# Patient Record
Sex: FEMALE | Race: ASIAN | NOT HISPANIC OR LATINO | URBAN - METROPOLITAN AREA
[De-identification: names, ages, dates, MRNs, and addresses within clinical notes are randomized per-mention and may not be internally consistent; named-entity substitution may affect disease eponyms.]

---

## 2019-06-15 ENCOUNTER — INPATIENT (INPATIENT)
Facility: HOSPITAL | Age: 79
LOS: 2 days | Discharge: HOME | End: 2019-06-18
Attending: SURGERY | Admitting: SURGERY
Payer: MEDICARE

## 2019-06-15 VITALS
OXYGEN SATURATION: 95 % | RESPIRATION RATE: 24 BRPM | TEMPERATURE: 102 F | SYSTOLIC BLOOD PRESSURE: 112 MMHG | DIASTOLIC BLOOD PRESSURE: 79 MMHG | HEART RATE: 110 BPM

## 2019-06-15 DIAGNOSIS — K81.9 CHOLECYSTITIS, UNSPECIFIED: ICD-10-CM

## 2019-06-15 LAB
ALBUMIN SERPL ELPH-MCNC: 3.5 G/DL — SIGNIFICANT CHANGE UP (ref 3.3–5)
ALP SERPL-CCNC: 88 U/L — SIGNIFICANT CHANGE UP (ref 40–120)
ALT FLD-CCNC: 15 U/L — SIGNIFICANT CHANGE UP (ref 4–33)
ANION GAP SERPL CALC-SCNC: 12 MMO/L — SIGNIFICANT CHANGE UP (ref 7–14)
APPEARANCE UR: SIGNIFICANT CHANGE UP
APTT BLD: 30.2 SEC — SIGNIFICANT CHANGE UP (ref 27.5–36.3)
AST SERPL-CCNC: 27 U/L — SIGNIFICANT CHANGE UP (ref 4–32)
BACTERIA # UR AUTO: NEGATIVE — SIGNIFICANT CHANGE UP
BASE EXCESS BLDV CALC-SCNC: 4.4 MMOL/L — SIGNIFICANT CHANGE UP
BASOPHILS # BLD AUTO: 0.05 K/UL — SIGNIFICANT CHANGE UP (ref 0–0.2)
BASOPHILS NFR BLD AUTO: 0.3 % — SIGNIFICANT CHANGE UP (ref 0–2)
BILIRUB SERPL-MCNC: 1.1 MG/DL — SIGNIFICANT CHANGE UP (ref 0.2–1.2)
BILIRUB UR-MCNC: SIGNIFICANT CHANGE UP
BLD GP AB SCN SERPL QL: NEGATIVE — SIGNIFICANT CHANGE UP
BLOOD GAS VENOUS - CREATININE: 0.61 MG/DL — SIGNIFICANT CHANGE UP (ref 0.5–1.3)
BLOOD UR QL VISUAL: NEGATIVE — SIGNIFICANT CHANGE UP
BUN SERPL-MCNC: 8 MG/DL — SIGNIFICANT CHANGE UP (ref 7–23)
CALCIUM SERPL-MCNC: 9.6 MG/DL — SIGNIFICANT CHANGE UP (ref 8.4–10.5)
CHLORIDE BLDV-SCNC: 99 MMOL/L — SIGNIFICANT CHANGE UP (ref 96–108)
CHLORIDE SERPL-SCNC: 94 MMOL/L — LOW (ref 98–107)
CO2 SERPL-SCNC: 27 MMOL/L — SIGNIFICANT CHANGE UP (ref 22–31)
COLOR SPEC: SIGNIFICANT CHANGE UP
CREAT SERPL-MCNC: 0.56 MG/DL — SIGNIFICANT CHANGE UP (ref 0.5–1.3)
EOSINOPHIL # BLD AUTO: 0.02 K/UL — SIGNIFICANT CHANGE UP (ref 0–0.5)
EOSINOPHIL NFR BLD AUTO: 0.1 % — SIGNIFICANT CHANGE UP (ref 0–6)
GAS PNL BLDV: 133 MMOL/L — LOW (ref 136–146)
GLUCOSE BLDV-MCNC: 146 MG/DL — HIGH (ref 70–99)
GLUCOSE SERPL-MCNC: 143 MG/DL — HIGH (ref 70–99)
GLUCOSE UR-MCNC: NEGATIVE — SIGNIFICANT CHANGE UP
HCO3 BLDV-SCNC: 28 MMOL/L — HIGH (ref 20–27)
HCT VFR BLD CALC: 45 % — SIGNIFICANT CHANGE UP (ref 34.5–45)
HCT VFR BLDV CALC: 45.1 % — HIGH (ref 34.5–45)
HGB BLD-MCNC: 14.3 G/DL — SIGNIFICANT CHANGE UP (ref 11.5–15.5)
HGB BLDV-MCNC: 14.7 G/DL — SIGNIFICANT CHANGE UP (ref 11.5–15.5)
IMM GRANULOCYTES NFR BLD AUTO: 0.5 % — SIGNIFICANT CHANGE UP (ref 0–1.5)
INR BLD: 1.26 — HIGH (ref 0.88–1.17)
KETONES UR-MCNC: SIGNIFICANT CHANGE UP
LACTATE BLDV-MCNC: 1.1 MMOL/L — SIGNIFICANT CHANGE UP (ref 0.5–2)
LEUKOCYTE ESTERASE UR-ACNC: SIGNIFICANT CHANGE UP
LIDOCAIN IGE QN: 11.8 U/L — SIGNIFICANT CHANGE UP (ref 7–60)
LYMPHOCYTES # BLD AUTO: 1.25 K/UL — SIGNIFICANT CHANGE UP (ref 1–3.3)
LYMPHOCYTES # BLD AUTO: 8.4 % — LOW (ref 13–44)
MCHC RBC-ENTMCNC: 27.8 PG — SIGNIFICANT CHANGE UP (ref 27–34)
MCHC RBC-ENTMCNC: 31.8 % — LOW (ref 32–36)
MCV RBC AUTO: 87.5 FL — SIGNIFICANT CHANGE UP (ref 80–100)
MONOCYTES # BLD AUTO: 1.01 K/UL — HIGH (ref 0–0.9)
MONOCYTES NFR BLD AUTO: 6.8 % — SIGNIFICANT CHANGE UP (ref 2–14)
NEUTROPHILS # BLD AUTO: 12.49 K/UL — HIGH (ref 1.8–7.4)
NEUTROPHILS NFR BLD AUTO: 83.9 % — HIGH (ref 43–77)
NITRITE UR-MCNC: NEGATIVE — SIGNIFICANT CHANGE UP
NRBC # FLD: 0 K/UL — SIGNIFICANT CHANGE UP (ref 0–0)
PCO2 BLDV: 43 MMHG — SIGNIFICANT CHANGE UP (ref 41–51)
PH BLDV: 7.44 PH — HIGH (ref 7.32–7.43)
PH UR: 6.5 — SIGNIFICANT CHANGE UP (ref 5–8)
PLATELET # BLD AUTO: 201 K/UL — SIGNIFICANT CHANGE UP (ref 150–400)
PMV BLD: 12 FL — SIGNIFICANT CHANGE UP (ref 7–13)
PO2 BLDV: 45 MMHG — HIGH (ref 35–40)
POTASSIUM BLDV-SCNC: 4 MMOL/L — SIGNIFICANT CHANGE UP (ref 3.4–4.5)
POTASSIUM SERPL-MCNC: 4.6 MMOL/L — SIGNIFICANT CHANGE UP (ref 3.5–5.3)
POTASSIUM SERPL-SCNC: 4.6 MMOL/L — SIGNIFICANT CHANGE UP (ref 3.5–5.3)
PROT SERPL-MCNC: 7.8 G/DL — SIGNIFICANT CHANGE UP (ref 6–8.3)
PROT UR-MCNC: 100 — HIGH
PROTHROM AB SERPL-ACNC: 14.5 SEC — HIGH (ref 9.8–13.1)
RBC # BLD: 5.14 M/UL — SIGNIFICANT CHANGE UP (ref 3.8–5.2)
RBC # FLD: 14.9 % — HIGH (ref 10.3–14.5)
RBC CASTS # UR COMP ASSIST: SIGNIFICANT CHANGE UP (ref 0–?)
RH IG SCN BLD-IMP: POSITIVE — SIGNIFICANT CHANGE UP
SAO2 % BLDV: 83.7 % — SIGNIFICANT CHANGE UP (ref 60–85)
SODIUM SERPL-SCNC: 133 MMOL/L — LOW (ref 135–145)
SP GR SPEC: 1.03 — SIGNIFICANT CHANGE UP (ref 1–1.04)
SQUAMOUS # UR AUTO: SIGNIFICANT CHANGE UP
UROBILINOGEN FLD QL: SIGNIFICANT CHANGE UP
WBC # BLD: 14.89 K/UL — HIGH (ref 3.8–10.5)
WBC # FLD AUTO: 14.89 K/UL — HIGH (ref 3.8–10.5)
WBC UR QL: HIGH (ref 0–?)

## 2019-06-15 PROCEDURE — 74176 CT ABD & PELVIS W/O CONTRAST: CPT | Mod: 26,GC

## 2019-06-15 PROCEDURE — 99222 1ST HOSP IP/OBS MODERATE 55: CPT

## 2019-06-15 PROCEDURE — 76705 ECHO EXAM OF ABDOMEN: CPT | Mod: 26

## 2019-06-15 RX ORDER — PIPERACILLIN AND TAZOBACTAM 4; .5 G/20ML; G/20ML
3.38 INJECTION, POWDER, LYOPHILIZED, FOR SOLUTION INTRAVENOUS EVERY 8 HOURS
Refills: 0 | Status: DISCONTINUED | OUTPATIENT
Start: 2019-06-15 | End: 2019-06-18

## 2019-06-15 RX ORDER — HYDROMORPHONE HYDROCHLORIDE 2 MG/ML
0.5 INJECTION INTRAMUSCULAR; INTRAVENOUS; SUBCUTANEOUS EVERY 4 HOURS
Refills: 0 | Status: DISCONTINUED | OUTPATIENT
Start: 2019-06-15 | End: 2019-06-17

## 2019-06-15 RX ORDER — AMLODIPINE BESYLATE 2.5 MG/1
5 TABLET ORAL DAILY
Refills: 0 | Status: DISCONTINUED | OUTPATIENT
Start: 2019-06-15 | End: 2019-06-18

## 2019-06-15 RX ORDER — HYDROMORPHONE HYDROCHLORIDE 2 MG/ML
1 INJECTION INTRAMUSCULAR; INTRAVENOUS; SUBCUTANEOUS EVERY 4 HOURS
Refills: 0 | Status: DISCONTINUED | OUTPATIENT
Start: 2019-06-15 | End: 2019-06-17

## 2019-06-15 RX ORDER — OXYCODONE AND ACETAMINOPHEN 5; 325 MG/1; MG/1
1 TABLET ORAL ONCE
Refills: 0 | Status: DISCONTINUED | OUTPATIENT
Start: 2019-06-15 | End: 2019-06-15

## 2019-06-15 RX ORDER — ENOXAPARIN SODIUM 100 MG/ML
40 INJECTION SUBCUTANEOUS DAILY
Refills: 0 | Status: DISCONTINUED | OUTPATIENT
Start: 2019-06-15 | End: 2019-06-18

## 2019-06-15 RX ORDER — PIPERACILLIN AND TAZOBACTAM 4; .5 G/20ML; G/20ML
3.38 INJECTION, POWDER, LYOPHILIZED, FOR SOLUTION INTRAVENOUS ONCE
Refills: 0 | Status: COMPLETED | OUTPATIENT
Start: 2019-06-15 | End: 2019-06-15

## 2019-06-15 RX ORDER — SODIUM CHLORIDE 9 MG/ML
1000 INJECTION, SOLUTION INTRAVENOUS
Refills: 0 | Status: DISCONTINUED | OUTPATIENT
Start: 2019-06-15 | End: 2019-06-17

## 2019-06-15 RX ORDER — BISOPROLOL FUMARATE 10 MG/1
5 TABLET, FILM COATED ORAL DAILY
Refills: 0 | Status: DISCONTINUED | OUTPATIENT
Start: 2019-06-15 | End: 2019-06-18

## 2019-06-15 RX ORDER — DIGOXIN 250 MCG
0.25 TABLET ORAL DAILY
Refills: 0 | Status: DISCONTINUED | OUTPATIENT
Start: 2019-06-15 | End: 2019-06-15

## 2019-06-15 RX ORDER — DIGOXIN 250 MCG
0.12 TABLET ORAL DAILY
Refills: 0 | Status: DISCONTINUED | OUTPATIENT
Start: 2019-06-15 | End: 2019-06-18

## 2019-06-15 RX ORDER — ACETAMINOPHEN 500 MG
975 TABLET ORAL ONCE
Refills: 0 | Status: COMPLETED | OUTPATIENT
Start: 2019-06-15 | End: 2019-06-15

## 2019-06-15 RX ORDER — ONDANSETRON 8 MG/1
4 TABLET, FILM COATED ORAL ONCE
Refills: 0 | Status: COMPLETED | OUTPATIENT
Start: 2019-06-15 | End: 2019-06-15

## 2019-06-15 RX ORDER — SODIUM CHLORIDE 9 MG/ML
1000 INJECTION INTRAMUSCULAR; INTRAVENOUS; SUBCUTANEOUS ONCE
Refills: 0 | Status: COMPLETED | OUTPATIENT
Start: 2019-06-15 | End: 2019-06-15

## 2019-06-15 RX ORDER — MORPHINE SULFATE 50 MG/1
4 CAPSULE, EXTENDED RELEASE ORAL ONCE
Refills: 0 | Status: DISCONTINUED | OUTPATIENT
Start: 2019-06-15 | End: 2019-06-15

## 2019-06-15 RX ADMIN — ENOXAPARIN SODIUM 40 MILLIGRAM(S): 100 INJECTION SUBCUTANEOUS at 21:59

## 2019-06-15 RX ADMIN — SODIUM CHLORIDE 125 MILLILITER(S): 9 INJECTION, SOLUTION INTRAVENOUS at 21:59

## 2019-06-15 RX ADMIN — MORPHINE SULFATE 4 MILLIGRAM(S): 50 CAPSULE, EXTENDED RELEASE ORAL at 14:52

## 2019-06-15 RX ADMIN — PIPERACILLIN AND TAZOBACTAM 25 GRAM(S): 4; .5 INJECTION, POWDER, LYOPHILIZED, FOR SOLUTION INTRAVENOUS at 21:59

## 2019-06-15 RX ADMIN — SODIUM CHLORIDE 125 MILLILITER(S): 9 INJECTION, SOLUTION INTRAVENOUS at 19:48

## 2019-06-15 RX ADMIN — OXYCODONE AND ACETAMINOPHEN 1 TABLET(S): 5; 325 TABLET ORAL at 21:59

## 2019-06-15 RX ADMIN — ONDANSETRON 4 MILLIGRAM(S): 8 TABLET, FILM COATED ORAL at 14:50

## 2019-06-15 RX ADMIN — OXYCODONE AND ACETAMINOPHEN 1 TABLET(S): 5; 325 TABLET ORAL at 22:29

## 2019-06-15 RX ADMIN — SODIUM CHLORIDE 1000 MILLILITER(S): 9 INJECTION INTRAMUSCULAR; INTRAVENOUS; SUBCUTANEOUS at 14:50

## 2019-06-15 RX ADMIN — Medication 975 MILLIGRAM(S): at 14:49

## 2019-06-15 RX ADMIN — PIPERACILLIN AND TAZOBACTAM 200 GRAM(S): 4; .5 INJECTION, POWDER, LYOPHILIZED, FOR SOLUTION INTRAVENOUS at 15:47

## 2019-06-15 NOTE — ED ADULT NURSE NOTE - CHIEF COMPLAINT QUOTE
Pt c/o pain in rt upper abdomen and vomiting since last pm and weakness and SOB--pt has fever to 102

## 2019-06-15 NOTE — ED PROVIDER NOTE - OBJECTIVE STATEMENT
79F with hx of HTN presenting with 2 days of RUQ pain radiating to right flank, fever, chills associated with nausea and vomiting. Had multiple episodes of nbnb emesis. No cough, chest pain, shortness of breath. No recent travel, recent trauma/surgery/immobilization or calf pain/swelling/redness.    Dr. Kamilah Thorne

## 2019-06-15 NOTE — ED PROVIDER NOTE - CLINICAL SUMMARY MEDICAL DECISION MAKING FREE TEXT BOX
79F with hx of HTN presenting with 2 days of RUQ pain radiating to right flank, fever, chills associated with nausea and vomiting. Ddx includes septic stone vs cholecystitis. Plan - basics, cultures, vbg, ivf, antipyretic, antibiotics, tba.

## 2019-06-15 NOTE — H&P ADULT - ASSESSMENT
79F acute cholecystitis    - admit to B team surgery  - IV zosyn  - NPO  - IVF resuscitation  - pain control  - VTE ppx  - family wants to wait til AM after a night of abx to decide on surgery  - d/w Dr. Rafa SMITH  B Team [id number]

## 2019-06-15 NOTE — ED PROVIDER NOTE - NS ED ROS FT
GENERAL: + fever, chills  HEENT: no cough, congestion, odynophagia, dysphagia  CARDIAC: no chest pain, palpitations, lightheadedness  PULM: no dyspnea, wheezing   GI: + RUQ pain, right flank pain, nausea, vomiting  : no urinary dysuria, frequency, incontinence, hematuria  NEURO: no headache, motor weakness, sensory changes  MSK: no joint or muscle pain  SKIN: no rashes, hives  HEME: no active bleeding, bruising

## 2019-06-15 NOTE — H&P ADULT - ATTENDING COMMENTS
Acute cholecystitis  Admit  NPO  IVF  Continue IV zosyn  Patient decided on operative management.  Add on for Monday\  CT reviewed

## 2019-06-15 NOTE — ED PROVIDER NOTE - PHYSICAL EXAMINATION
GENERAL: no acute distress, non-toxic appearing, febrile, tachycardic  HEAD: normocephalic, atraumatic  HEENT: normal conjunctiva, oral mucosa moist, neck supple  CARDIAC: regular rate and rhythm, normal S1 and S2,  no appreciable murmurs  PULM: clear to ascultation bilaterally, no crackles, rales, rhonchi, or wheezing  GI: ttp epigastrium, ruq with guarding  NEURO: alert and oriented x 3, normal speech, PERRLA, EOMI, no focal motor or sensory deficits  MSK: no visible deformities, no peripheral edema, calf tenderness/redness/swelling  SKIN: no visible rashes, dry, well-perfused  PSYCH: appropriate mood and affect

## 2019-06-15 NOTE — ED ADULT NURSE NOTE - OBJECTIVE STATEMENT
Pt. A&Ox3, primarily Frisian speaking with daughter at bedside translating, c/o right sided abdominal pain radiating to right flank starting yesterday. Pain worsening this AM with fever/chills and 5 episodes of vomiting. Denies diarrhea, hematuria, dysuria, bloody stools, cp, or sob. Abdomen very tender on palpation. Ambulates independently at baseline. #20g IV placed in left AC, labs sent. MD at bedside for eval. VS done as charted, breathing well on RA. Respirations even and unlabored. Will continue to monitor.

## 2019-06-15 NOTE — H&P ADULT - HISTORY OF PRESENT ILLNESS
79F hx of HTN who presents with 2d RUQ abdominal pain associated with nausea/vomiting. She had fever and chills at home and is febrile in ED. She has not had pain like this before. She denies surgical history. She denies family hx of gallstones. She had been passing flatus and BMs normally.   In the ED received Zosyn and 1L NS.  Vital Signs Last 24 Hrs  T(C): 36.6 (15 Davin 2019 17:35), Max: 38.7 (15 Davin 2019 13:38)  T(F): 97.8 (15 Davin 2019 17:35), Max: 101.6 (15 Davin 2019 13:38)  HR: 91 (15 Davin 2019 17:35) (91 - 110)  BP: 117/66 (15 Davin 2019 17:35) (112/79 - 138/78)  BP(mean): --  RR: 20 (15 Davin 2019 17:35) (20 - 24)  SpO2: 95% (15 Davin 2019 17:35) (95% - 96%)

## 2019-06-15 NOTE — ED PROVIDER NOTE - ATTENDING CONTRIBUTION TO CARE
agree with resident note    "79F with hx of HTN presenting with 2 days of RUQ pain radiating to right flank, fever, chills associated with nausea and vomiting. Had multiple episodes of nbnb emesis. No cough, chest pain, shortness of breath. No recent travel, recent trauma/surgery/immobilization or calf pain/swelling/redness.  "    PE: febrile, uncomfortable, normotensive; CTAB/L; s1 s2 no m/r/g abd: TTP from RUQ to right flank; no rebound no guarding ext: no edema

## 2019-06-15 NOTE — ED ADULT NURSE NOTE - NSIMPLEMENTINTERV_GEN_ALL_ED
Implemented All Universal Safety Interventions:  [place] to call system. Call bell, personal items and telephone within reach. Instruct patient to call for assistance. Room bathroom lighting operational. Non-slip footwear when patient is off stretcher. Physically safe environment: no spills, clutter or unnecessary equipment. Stretcher in lowest position, wheels locked, appropriate side rails in place.

## 2019-06-15 NOTE — H&P ADULT - NSHPPHYSICALEXAM_GEN_ALL_CORE
NAD, awake and alert  No rashes  No jaundice or scleral icterus  Respirations nonlabored  CV Regular  Abdomen soft, focally tender RUQ  + Lopez's sign  Extremities warm

## 2019-06-15 NOTE — H&P ADULT - NSHPLABSRESULTS_GEN_ALL_CORE
CBC Full  -  ( 15 Davin 2019 14:15 )  WBC Count : 14.89 K/uL  RBC Count : 5.14 M/uL  Hemoglobin : 14.3 g/dL  Hematocrit : 45.0 %  Platelet Count - Automated : 201 K/uL  Mean Cell Volume : 87.5 fL  Mean Cell Hemoglobin : 27.8 pg  Mean Cell Hemoglobin Concentration : 31.8 %  Auto Neutrophil # : 12.49 K/uL  Auto Lymphocyte # : 1.25 K/uL  Auto Monocyte # : 1.01 K/uL  Auto Eosinophil # : 0.02 K/uL  Auto Basophil # : 0.05 K/uL  Auto Neutrophil % : 83.9 %  Auto Lymphocyte % : 8.4 %  Auto Monocyte % : 6.8 %  Auto Eosinophil % : 0.1 %  Auto Basophil % : 0.3 %    06-15    133<L>  |  94<L>  |  8   ----------------------------<  143<H>  4.6   |  27  |  0.56    Ca    9.6      15 Davin 2019 14:15    TPro  7.8  /  Alb  3.5  /  TBili  1.1  /  DBili  x   /  AST  27  /  ALT  15  /  AlkPhos  88  06-15    < from: US Abdomen Limited (06.15.19 @ 16:50) >    FINDINGS:    Liver: Within normal limits.    Bile ducts: Normal caliber. Common bile duct measures 5 mm.     Gallbladder: Cholelithiasis in a nondistended gallbladder with mild wall   thickening and trace pericholecystic fluid. Positive sonographic Lopez's   sign.    Pancreas: Visualized portions are within normal limits.    Right kidney: 11.6 cm. No hydronephrosis. A couple renal cysts measuring   up to 1.5 cm at the upper pole.     Ascites: None.    Aorta and IVC: Visualized portions are within normal limits.    IMPRESSION:     Acute cholecystitis.    < end of copied text >

## 2019-06-15 NOTE — ED PROVIDER NOTE - PROGRESS NOTE DETAILS
Tong: left message with Dr. Kamilah Thorne's answering service [phone] Tong: surgery paged for acute camryn. Tong: surgery consulted - will see patient shortly.

## 2019-06-16 LAB
ALBUMIN SERPL ELPH-MCNC: 3 G/DL — LOW (ref 3.3–5)
ALBUMIN SERPL ELPH-MCNC: 3.2 G/DL — LOW (ref 3.3–5)
ALP SERPL-CCNC: 221 U/L — HIGH (ref 40–120)
ALP SERPL-CCNC: 270 U/L — HIGH (ref 40–120)
ALT FLD-CCNC: 44 U/L — HIGH (ref 4–33)
ALT FLD-CCNC: 59 U/L — HIGH (ref 4–33)
ANION GAP SERPL CALC-SCNC: 12 MMO/L — SIGNIFICANT CHANGE UP (ref 7–14)
ANION GAP SERPL CALC-SCNC: 13 MMO/L — SIGNIFICANT CHANGE UP (ref 7–14)
AST SERPL-CCNC: 129 U/L — HIGH (ref 4–32)
AST SERPL-CCNC: 66 U/L — HIGH (ref 4–32)
BILIRUB DIRECT SERPL-MCNC: 1.1 MG/DL — HIGH (ref 0.1–0.2)
BILIRUB SERPL-MCNC: 0.8 MG/DL — SIGNIFICANT CHANGE UP (ref 0.2–1.2)
BILIRUB SERPL-MCNC: 1.5 MG/DL — HIGH (ref 0.2–1.2)
BUN SERPL-MCNC: 11 MG/DL — SIGNIFICANT CHANGE UP (ref 7–23)
BUN SERPL-MCNC: 9 MG/DL — SIGNIFICANT CHANGE UP (ref 7–23)
CALCIUM SERPL-MCNC: 8.8 MG/DL — SIGNIFICANT CHANGE UP (ref 8.4–10.5)
CALCIUM SERPL-MCNC: 9.1 MG/DL — SIGNIFICANT CHANGE UP (ref 8.4–10.5)
CHLORIDE SERPL-SCNC: 95 MMOL/L — LOW (ref 98–107)
CHLORIDE SERPL-SCNC: 96 MMOL/L — LOW (ref 98–107)
CK MB BLD-MCNC: 1.83 NG/ML — SIGNIFICANT CHANGE UP (ref 1–4.7)
CK MB BLD-MCNC: 2.35 NG/ML — SIGNIFICANT CHANGE UP (ref 1–4.7)
CK MB BLD-MCNC: 2.39 NG/ML — SIGNIFICANT CHANGE UP (ref 1–4.7)
CK MB BLD-MCNC: SIGNIFICANT CHANGE UP (ref 0–2.5)
CK SERPL-CCNC: 34 U/L — SIGNIFICANT CHANGE UP (ref 25–170)
CK SERPL-CCNC: 35 U/L — SIGNIFICANT CHANGE UP (ref 25–170)
CK SERPL-CCNC: 36 U/L — SIGNIFICANT CHANGE UP (ref 25–170)
CO2 SERPL-SCNC: 26 MMOL/L — SIGNIFICANT CHANGE UP (ref 22–31)
CO2 SERPL-SCNC: 27 MMOL/L — SIGNIFICANT CHANGE UP (ref 22–31)
CREAT SERPL-MCNC: 0.56 MG/DL — SIGNIFICANT CHANGE UP (ref 0.5–1.3)
CREAT SERPL-MCNC: 0.73 MG/DL — SIGNIFICANT CHANGE UP (ref 0.5–1.3)
GLUCOSE BLDC GLUCOMTR-MCNC: 130 MG/DL — HIGH (ref 70–99)
GLUCOSE SERPL-MCNC: 157 MG/DL — HIGH (ref 70–99)
GLUCOSE SERPL-MCNC: 89 MG/DL — SIGNIFICANT CHANGE UP (ref 70–99)
HCT VFR BLD CALC: 44.3 % — SIGNIFICANT CHANGE UP (ref 34.5–45)
HGB BLD-MCNC: 13.6 G/DL — SIGNIFICANT CHANGE UP (ref 11.5–15.5)
MAGNESIUM SERPL-MCNC: 1.9 MG/DL — SIGNIFICANT CHANGE UP (ref 1.6–2.6)
MAGNESIUM SERPL-MCNC: 2 MG/DL — SIGNIFICANT CHANGE UP (ref 1.6–2.6)
MCHC RBC-ENTMCNC: 27.6 PG — SIGNIFICANT CHANGE UP (ref 27–34)
MCHC RBC-ENTMCNC: 30.7 % — LOW (ref 32–36)
MCV RBC AUTO: 90 FL — SIGNIFICANT CHANGE UP (ref 80–100)
NRBC # FLD: 0 K/UL — SIGNIFICANT CHANGE UP (ref 0–0)
PHOSPHATE SERPL-MCNC: 2.1 MG/DL — LOW (ref 2.5–4.5)
PHOSPHATE SERPL-MCNC: 3 MG/DL — SIGNIFICANT CHANGE UP (ref 2.5–4.5)
PLATELET # BLD AUTO: 235 K/UL — SIGNIFICANT CHANGE UP (ref 150–400)
PMV BLD: 11.9 FL — SIGNIFICANT CHANGE UP (ref 7–13)
POTASSIUM SERPL-MCNC: 4 MMOL/L — SIGNIFICANT CHANGE UP (ref 3.5–5.3)
POTASSIUM SERPL-MCNC: 4.6 MMOL/L — SIGNIFICANT CHANGE UP (ref 3.5–5.3)
POTASSIUM SERPL-SCNC: 4 MMOL/L — SIGNIFICANT CHANGE UP (ref 3.5–5.3)
POTASSIUM SERPL-SCNC: 4.6 MMOL/L — SIGNIFICANT CHANGE UP (ref 3.5–5.3)
PROT SERPL-MCNC: 7.1 G/DL — SIGNIFICANT CHANGE UP (ref 6–8.3)
PROT SERPL-MCNC: 7.4 G/DL — SIGNIFICANT CHANGE UP (ref 6–8.3)
RBC # BLD: 4.92 M/UL — SIGNIFICANT CHANGE UP (ref 3.8–5.2)
RBC # FLD: 15.1 % — HIGH (ref 10.3–14.5)
SODIUM SERPL-SCNC: 134 MMOL/L — LOW (ref 135–145)
SODIUM SERPL-SCNC: 135 MMOL/L — SIGNIFICANT CHANGE UP (ref 135–145)
SPECIMEN SOURCE: SIGNIFICANT CHANGE UP
SPECIMEN SOURCE: SIGNIFICANT CHANGE UP
TROPONIN T, HIGH SENSITIVITY: 25 NG/L — SIGNIFICANT CHANGE UP (ref ?–14)
TROPONIN T, HIGH SENSITIVITY: 54 NG/L — CRITICAL HIGH (ref ?–14)
TROPONIN T, HIGH SENSITIVITY: 60 NG/L — CRITICAL HIGH (ref ?–14)
TROPONIN T, HIGH SENSITIVITY: 80 NG/L — CRITICAL HIGH (ref ?–14)
WBC # BLD: 13.58 K/UL — HIGH (ref 3.8–10.5)
WBC # FLD AUTO: 13.58 K/UL — HIGH (ref 3.8–10.5)

## 2019-06-16 PROCEDURE — 71045 X-RAY EXAM CHEST 1 VIEW: CPT | Mod: 26

## 2019-06-16 PROCEDURE — 99232 SBSQ HOSP IP/OBS MODERATE 35: CPT

## 2019-06-16 PROCEDURE — 93010 ELECTROCARDIOGRAM REPORT: CPT | Mod: 77

## 2019-06-16 PROCEDURE — 93010 ELECTROCARDIOGRAM REPORT: CPT

## 2019-06-16 RX ORDER — ACETAMINOPHEN 500 MG
1000 TABLET ORAL ONCE
Refills: 0 | Status: COMPLETED | OUTPATIENT
Start: 2019-06-16 | End: 2019-06-16

## 2019-06-16 RX ORDER — ACETAMINOPHEN 500 MG
1000 TABLET ORAL ONCE
Refills: 0 | Status: COMPLETED | OUTPATIENT
Start: 2019-06-17 | End: 2019-06-17

## 2019-06-16 RX ADMIN — PIPERACILLIN AND TAZOBACTAM 25 GRAM(S): 4; .5 INJECTION, POWDER, LYOPHILIZED, FOR SOLUTION INTRAVENOUS at 22:09

## 2019-06-16 RX ADMIN — Medication 400 MILLIGRAM(S): at 06:59

## 2019-06-16 RX ADMIN — AMLODIPINE BESYLATE 5 MILLIGRAM(S): 2.5 TABLET ORAL at 08:59

## 2019-06-16 RX ADMIN — Medication 1000 MILLIGRAM(S): at 07:29

## 2019-06-16 RX ADMIN — ENOXAPARIN SODIUM 40 MILLIGRAM(S): 100 INJECTION SUBCUTANEOUS at 16:51

## 2019-06-16 RX ADMIN — Medication 400 MILLIGRAM(S): at 22:09

## 2019-06-16 RX ADMIN — PIPERACILLIN AND TAZOBACTAM 25 GRAM(S): 4; .5 INJECTION, POWDER, LYOPHILIZED, FOR SOLUTION INTRAVENOUS at 16:51

## 2019-06-16 RX ADMIN — Medication 0.12 MILLIGRAM(S): at 08:59

## 2019-06-16 RX ADMIN — PIPERACILLIN AND TAZOBACTAM 25 GRAM(S): 4; .5 INJECTION, POWDER, LYOPHILIZED, FOR SOLUTION INTRAVENOUS at 09:00

## 2019-06-16 RX ADMIN — Medication 1000 MILLIGRAM(S): at 22:39

## 2019-06-16 NOTE — PROVIDER CONTACT NOTE (OTHER) - REASON
Patient desating to mid 40's that is lethargic, unarousable, confused presenting with low grade fever, chills, sweats,

## 2019-06-16 NOTE — CONSULT NOTE ADULT - ATTENDING COMMENTS
Mildly elevated hsTrop. Most likely a small type II event in the setting of an acute illness. No signs of plaque rupture or acute ischemia. May proceed with planned procedure.

## 2019-06-16 NOTE — CONSULT NOTE ADULT - ASSESSMENT
80 yo woman w/ hx of HTN who presented w/ acute cholecystitis.  Consulted for cardiac risk assessment prior to laparoscopic cholecystectomy.    Optimized from a cardiac standpoint w/ no sx's concerning for acute myocardial injury, heart failure or acute lung pathology.  Euvolemic on exam, normal ECG.    Intermmediate risk patient (female, age>65) for low-intermmediate risk procedure.      RECS  - no cardiac contraindications to proceed w/ surgical procedure   - discontinue digoxin and bisoprolol as there's no clear indications for this medications   - cont rest of meds     A Gilberto SMITH   [facility] [id number] 78 yo woman w/ hx of HTN who presented w/ acute cholecystitis.  Consulted for cardiac risk assessment prior to laparoscopic cholecystectomy.    Optimized from a cardiac standpoint w/ no sx's concerning for acute myocardial injury, heart failure or acute lung pathology.  Euvolemic on exam, normal ECG.    Intermmediate risk patient (female, age>65) for low-intermmediate risk procedure.      Reviewed labs and does have mildly positive HS trops, which is likely due to acute GI pathology; not consistent w/ ACS.      RECS  - no cardiac contraindications to proceed w/ surgical procedure   - discontinue digoxin and bisoprolol as there's no clear indications for this medications   - cont rest of meds     A Gilberto SMITH   [facility] [id number]

## 2019-06-16 NOTE — PROVIDER CONTACT NOTE (OTHER) - ACTION/TREATMENT ORDERED:
Give IV tylenol, reassess, administer Oxygen at 2 LPM and continue to monitor, hold morning medications until symptoms subside.

## 2019-06-16 NOTE — CONSULT NOTE ADULT - SUBJECTIVE AND OBJECTIVE BOX
80 yo woman w/ hx of HTN who presented w/ RUQ abdominal pain, nausea, chills which started two days prior.  Pt presented to our ED and found to have imaging/clinical evidence of acute cholecystitis.  Scheduled for Lap cholecystectomy next week.  General cardiology consulted for cardiac-risk assessment.      Pt denies any heart hx or family hx of heart disease.  States that she's on digoxin and bisoprolol but not sure why.  Does not recall ever being told that she has an irregular heart beat; but was told that she may have had a "tachycardia" but not quite sure otherwise.  Never taken any AC agents (asa, plavix/ticagrelor, NOACs, or coumadin).      No other new/acute issues addressed.        PMH:   HTN (hypertension)      PSH:       Medications:   acetaminophen  IVPB .. 1000 milliGRAM(s) IV Intermittent once  amLODIPine   Tablet 5 milliGRAM(s) Oral daily  bisoprolol   Tablet 5 milliGRAM(s) Oral daily  digoxin     Tablet 0.125 milliGRAM(s) Oral daily  enoxaparin Injectable 40 milliGRAM(s) SubCutaneous daily  HYDROmorphone  Injectable 0.5 milliGRAM(s) IV Push every 4 hours PRN  HYDROmorphone  Injectable 1 milliGRAM(s) IV Push every 4 hours PRN  lactated ringers. 1000 milliLiter(s) IV Continuous <Continuous>  piperacillin/tazobactam IVPB. 3.375 Gram(s) IV Intermittent every 8 hours      Allergies:  No Known Allergies      FAMILY HISTORY:      Social History:  Smoking History:  Alcohol Use:  Drug Use:    Review of Systems:  REVIEW OF SYSTEMS:  CONSTITUTIONAL: + chills  EYES/ENT: No visual changes;  No dysphagia  NECK: No pain or stiffness  RESPIRATORY: No cough, wheezing, hemoptysis; No shortness of breath  CARDIOVASCULAR: No chest pain or palpitations; No lower extremity edema  GASTROINTESTINAL: +abdominal or epigastric pain. +nausea, no hematemesis; No diarrhea or constipation. No melena or hematochezia.  BACK: No back pain  GENITOURINARY: No dysuria, frequency or hematuria  NEUROLOGICAL: No numbness or weakness  SKIN: No itching, burning, rashes, or lesions   All other review of systems is negative unless indicated above.    Physical Exam:  T(F): 98.4 (06-16), Max: 99.9 (06-16)  HR: 72 (06-16) (72 - 123)  BP: 99/59 (06-16) (90/61 - 119/74)  RR: 17 (06-16)  SpO2: 98% (06-16)    GENERAL: No acute distress, well-developed  HEAD:  Atraumatic, Normocephalic  ENT: EOMI, PERRLA, conjunctiva and sclera clear, Neck supple, JVP not elevated   CHEST/LUNG: Clear to auscultation bilaterally; No wheeze, equal breath sounds bilaterally   BACK: No spinal tenderness  HEART: Regular rate and rhythm; No murmurs, rubs, or gallops  ABDOMEN: Soft, TTP in RUQ w/ Bowel sounds present  EXTREMITIES:  No clubbing, cyanosis, or edema  PSYCH: Nl behavior, nl affect  NEUROLOGY: AAOx3, non-focal, cranial nerves intact  SKIN: Normal color, No rashes or lesions  LINES:    Cardiovascular Diagnostic Testing:    ECG: sinus rhythm, no acute ischemic changes     Interpretation of Telemetry:  sinus rhythm, HR 70s     Imaging:    Labs: Personally reviewed                        13.6   13.58 )-----------( 235      ( 16 Jun 2019 06:24 )             44.3     06-16    135  |  96<L>  |  9   ----------------------------<  157<H>  4.6   |  26  |  0.73    Ca    8.8      16 Jun 2019 06:24  Phos  3.0     06-16  Mg     2.0     06-16    TPro  7.4  /  Alb  3.2<L>  /  TBili  1.5<H>  /  DBili  1.1<H>  /  AST  129<H>  /  ALT  59<H>  /  AlkPhos  270<H>  06-16    PT/INR - ( 15 Davin 2019 14:15 )   PT: 14.5 SEC;   INR: 1.26          PTT - ( 15 Davin 2019 14:15 )  PTT:30.2 SEC  CARDIAC MARKERS ( 16 Jun 2019 12:51 )  x     / x     / 35 u/L / 2.35 ng/mL / x      CARDIAC MARKERS ( 16 Jun 2019 09:56 )  x     / x     / 34 u/L / 1.83 ng/mL / x

## 2019-06-16 NOTE — PROVIDER CONTACT NOTE (OTHER) - ASSESSMENT
Patient is lethargic, unarousable, confused presenting with low grade fever, chills, sweats. Sips of water offered and seated at 90 degrees, elevated heart rate in the 130.

## 2019-06-16 NOTE — PROGRESS NOTE ADULT - SUBJECTIVE AND OBJECTIVE BOX
GENERAL SURGERY DAILY PROGRESS NOTE:     Subjective:  Pt seen and examined. No acute events overnight. This am pt w/ episode of desat, requiring 4 L of oxygen and tachycardic. EKG, troponin done, pain medicatino administered and pt less sympomatic. Pt denies n/v at this time.     Objective:    MEDICATIONS  (STANDING):  acetaminophen  IVPB .. 1000 milliGRAM(s) IV Intermittent once  acetaminophen  IVPB .. 1000 milliGRAM(s) IV Intermittent once  amLODIPine   Tablet 5 milliGRAM(s) Oral daily  bisoprolol   Tablet 5 milliGRAM(s) Oral daily  digoxin     Tablet 0.125 milliGRAM(s) Oral daily  enoxaparin Injectable 40 milliGRAM(s) SubCutaneous daily  lactated ringers. 1000 milliLiter(s) (125 mL/Hr) IV Continuous <Continuous>  piperacillin/tazobactam IVPB. 3.375 Gram(s) IV Intermittent every 8 hours    MEDICATIONS  (PRN):  HYDROmorphone  Injectable 0.5 milliGRAM(s) IV Push every 4 hours PRN Moderate Pain (4 - 6)  HYDROmorphone  Injectable 1 milliGRAM(s) IV Push every 4 hours PRN Severe Pain (7 - 10)      Vital Signs Last 24 Hrs  T(C): 37.7 (2019 07:20), Max: 38.7 (15 Davin 2019 13:38)  T(F): 99.9 (2019 07:20), Max: 101.6 (15 Davin 2019 13:38)  HR: 102 (2019 07:20) (83 - 123)  BP: 90/61 (2019 07:20) (90/61 - 138/78)  BP(mean): --  RR: 16 (2019 07:20) (16 - 24)  SpO2: 99% (2019 07:20) (94% - 99%)    I&O's Detail    15 Davin 2019 07:01  -  2019 07:00  --------------------------------------------------------  IN:    IV PiggyBack: 200 mL    lactated ringers.: 1250 mL    Oral Fluid: 30 mL  Total IN: 1480 mL    OUT:  Total OUT: 0 mL    Total NET: 1480 mL    PHYSICAL EXAM  NAD, awake and alert  Respirations nonlabored  Abdomen soft, tender in RUQ, nondistended  No guarding or rebound tenderness        Daily Height in cm: 152.4 (15 Davin 2019 21:58)    Daily     LABS:                        13.6   13.58 )-----------( 235      ( 2019 06:24 )             44.3     06-16    135  |  96<L>  |  9   ----------------------------<  157<H>  4.6   |  26  |  0.73    Ca    8.8      2019 06:24  Phos  3.0       Mg     2.0     16    TPro  7.4  /  Alb  3.2<L>  /  TBili  1.5<H>  /  DBili  1.1<H>  /  AST  129<H>  /  ALT  59<H>  /  AlkPhos  270<H>  16    PT/INR - ( 15 Davin 2019 14:15 )   PT: 14.5 SEC;   INR: 1.26          PTT - ( 15 Davin 2019 14:15 )  PTT:30.2 SEC  Urinalysis Basic - ( 15 Davin 2019 16:05 )    Color: DARK YELLOW / Appearance: Lt TURBID / S.031 / pH: 6.5  Gluc: NEGATIVE / Ketone: SMALL  / Bili: TRACE / Urobili: TRACE   Blood: NEGATIVE / Protein: 100 / Nitrite: NEGATIVE   Leuk Esterase: SMALL / RBC: 0-2 / WBC 6-10   Sq Epi: FEW / Non Sq Epi: x / Bacteria: NEGATIVE        RADIOLOGY & ADDITIONAL STUDIES:

## 2019-06-17 LAB
ALBUMIN SERPL ELPH-MCNC: 2.8 G/DL — LOW (ref 3.3–5)
ALP SERPL-CCNC: 179 U/L — HIGH (ref 40–120)
ALT FLD-CCNC: 34 U/L — HIGH (ref 4–33)
ANION GAP SERPL CALC-SCNC: 11 MMO/L — SIGNIFICANT CHANGE UP (ref 7–14)
APTT BLD: 29.2 SEC — SIGNIFICANT CHANGE UP (ref 27.5–36.3)
AST SERPL-CCNC: 42 U/L — HIGH (ref 4–32)
BILIRUB SERPL-MCNC: 0.7 MG/DL — SIGNIFICANT CHANGE UP (ref 0.2–1.2)
BLD GP AB SCN SERPL QL: NEGATIVE — SIGNIFICANT CHANGE UP
BUN SERPL-MCNC: 10 MG/DL — SIGNIFICANT CHANGE UP (ref 7–23)
CALCIUM SERPL-MCNC: 8.6 MG/DL — SIGNIFICANT CHANGE UP (ref 8.4–10.5)
CHLORIDE SERPL-SCNC: 94 MMOL/L — LOW (ref 98–107)
CO2 SERPL-SCNC: 29 MMOL/L — SIGNIFICANT CHANGE UP (ref 22–31)
CREAT SERPL-MCNC: 0.59 MG/DL — SIGNIFICANT CHANGE UP (ref 0.5–1.3)
GLUCOSE SERPL-MCNC: 88 MG/DL — SIGNIFICANT CHANGE UP (ref 70–99)
HCT VFR BLD CALC: 38 % — SIGNIFICANT CHANGE UP (ref 34.5–45)
HGB BLD-MCNC: 11.7 G/DL — SIGNIFICANT CHANGE UP (ref 11.5–15.5)
INR BLD: 1.23 — HIGH (ref 0.88–1.17)
MAGNESIUM SERPL-MCNC: 2 MG/DL — SIGNIFICANT CHANGE UP (ref 1.6–2.6)
MCHC RBC-ENTMCNC: 27.3 PG — SIGNIFICANT CHANGE UP (ref 27–34)
MCHC RBC-ENTMCNC: 30.8 % — LOW (ref 32–36)
MCV RBC AUTO: 88.8 FL — SIGNIFICANT CHANGE UP (ref 80–100)
NRBC # FLD: 0 K/UL — SIGNIFICANT CHANGE UP (ref 0–0)
PHOSPHATE SERPL-MCNC: 3.4 MG/DL — SIGNIFICANT CHANGE UP (ref 2.5–4.5)
PLATELET # BLD AUTO: 210 K/UL — SIGNIFICANT CHANGE UP (ref 150–400)
PMV BLD: 11.9 FL — SIGNIFICANT CHANGE UP (ref 7–13)
POTASSIUM SERPL-MCNC: 3.8 MMOL/L — SIGNIFICANT CHANGE UP (ref 3.5–5.3)
POTASSIUM SERPL-SCNC: 3.8 MMOL/L — SIGNIFICANT CHANGE UP (ref 3.5–5.3)
PROT SERPL-MCNC: 6.4 G/DL — SIGNIFICANT CHANGE UP (ref 6–8.3)
PROTHROM AB SERPL-ACNC: 13.7 SEC — HIGH (ref 9.8–13.1)
RBC # BLD: 4.28 M/UL — SIGNIFICANT CHANGE UP (ref 3.8–5.2)
RBC # FLD: 15 % — HIGH (ref 10.3–14.5)
RH IG SCN BLD-IMP: POSITIVE — SIGNIFICANT CHANGE UP
SODIUM SERPL-SCNC: 134 MMOL/L — LOW (ref 135–145)
SPECIMEN SOURCE: SIGNIFICANT CHANGE UP
WBC # BLD: 7.36 K/UL — SIGNIFICANT CHANGE UP (ref 3.8–10.5)
WBC # FLD AUTO: 7.36 K/UL — SIGNIFICANT CHANGE UP (ref 3.8–10.5)

## 2019-06-17 PROCEDURE — 99232 SBSQ HOSP IP/OBS MODERATE 35: CPT | Mod: GC

## 2019-06-17 PROCEDURE — 93306 TTE W/DOPPLER COMPLETE: CPT | Mod: 26

## 2019-06-17 PROCEDURE — 99222 1ST HOSP IP/OBS MODERATE 55: CPT

## 2019-06-17 RX ORDER — SODIUM CHLORIDE 9 MG/ML
1000 INJECTION, SOLUTION INTRAVENOUS
Refills: 0 | Status: DISCONTINUED | OUTPATIENT
Start: 2019-06-17 | End: 2019-06-18

## 2019-06-17 RX ADMIN — Medication 1000 MILLIGRAM(S): at 05:05

## 2019-06-17 RX ADMIN — PIPERACILLIN AND TAZOBACTAM 25 GRAM(S): 4; .5 INJECTION, POWDER, LYOPHILIZED, FOR SOLUTION INTRAVENOUS at 13:13

## 2019-06-17 RX ADMIN — ENOXAPARIN SODIUM 40 MILLIGRAM(S): 100 INJECTION SUBCUTANEOUS at 13:12

## 2019-06-17 RX ADMIN — PIPERACILLIN AND TAZOBACTAM 25 GRAM(S): 4; .5 INJECTION, POWDER, LYOPHILIZED, FOR SOLUTION INTRAVENOUS at 07:10

## 2019-06-17 RX ADMIN — SODIUM CHLORIDE 50 MILLILITER(S): 9 INJECTION, SOLUTION INTRAVENOUS at 22:16

## 2019-06-17 RX ADMIN — Medication 400 MILLIGRAM(S): at 04:35

## 2019-06-17 RX ADMIN — PIPERACILLIN AND TAZOBACTAM 25 GRAM(S): 4; .5 INJECTION, POWDER, LYOPHILIZED, FOR SOLUTION INTRAVENOUS at 22:16

## 2019-06-17 RX ADMIN — Medication 63.75 MILLIMOLE(S): at 02:27

## 2019-06-17 RX ADMIN — Medication 0.12 MILLIGRAM(S): at 07:10

## 2019-06-17 NOTE — PROGRESS NOTE ADULT - ASSESSMENT
79F acute cholecystitis, plan for OR today.     - OR today  - cardiac optimization - optimized, f/u recs  - IV zosyn  - NPO  - IVF resuscitation  - pain control  - call pmd regarding medication - bb and digoxin  - VTE ppx    B Team [id number]

## 2019-06-17 NOTE — PROGRESS NOTE ADULT - SUBJECTIVE AND OBJECTIVE BOX
GENERAL SURGERY DAILY PROGRESS NOTE:     Subjective:  Pt seen and examined. No acute events overnight. Yetserday pt w/ elevated troponin after episode of desaturation. Per cardiology, elevated troponin less likely to be from cardiac source and noted to stop trending. Reporting that pt optimized for OR from cardiac stand point. Added on for olive camryn today, consented.     Objective:    MEDICATIONS  (STANDING):  acetaminophen  IVPB .. 1000 milliGRAM(s) IV Intermittent once  acetaminophen  IVPB .. 1000 milliGRAM(s) IV Intermittent once  amLODIPine   Tablet 5 milliGRAM(s) Oral daily  bisoprolol   Tablet 5 milliGRAM(s) Oral daily  digoxin     Tablet 0.125 milliGRAM(s) Oral daily  enoxaparin Injectable 40 milliGRAM(s) SubCutaneous daily  lactated ringers. 1000 milliLiter(s) (125 mL/Hr) IV Continuous <Continuous>  piperacillin/tazobactam IVPB. 3.375 Gram(s) IV Intermittent every 8 hours  sodium phosphate IVPB 15 milliMole(s) IV Intermittent once    MEDICATIONS  (PRN):  HYDROmorphone  Injectable 0.5 milliGRAM(s) IV Push every 4 hours PRN Moderate Pain (4 - 6)  HYDROmorphone  Injectable 1 milliGRAM(s) IV Push every 4 hours PRN Severe Pain (7 - 10)      Vital Signs Last 24 Hrs  T(C): 37.5 (2019 22:03), Max: 37.7 (2019 07:20)  T(F): 99.5 (2019 22:03), Max: 99.9 (2019 07:20)  HR: 81 (2019 22:03) (72 - 123)  BP: 114/66 (2019 22:03) (90/61 - 119/74)  BP(mean): --  RR: 16 (2019 22:03) (16 - 20)  SpO2: 93% (2019 22:03) (93% - 99%)    I&O's Detail    15 Davin 2019 07:01  -  2019 07:00  --------------------------------------------------------  IN:    IV PiggyBack: 200 mL    lactated ringers.: 1250 mL    Oral Fluid: 30 mL  Total IN: 1480 mL    OUT:  Total OUT: 0 mL    Total NET: 1480 mL      2019 07:01  -  2019 00:46  --------------------------------------------------------  IN:    IV PiggyBack: 375 mL    lactated ringers.: 750 mL  Total IN: 1125 mL    OUT:  Total OUT: 0 mL    Total NET: 1125 mL    PHYSICAL EXAM  NAD, awake and alert  Respirations nonlabored  Abdomen soft, RUQ tenderness, nondistended  No guarding or rebound tenderness        Daily     Daily     LABS:                        13.6   13.58 )-----------( 235      ( 2019 06:24 )             44.3     06-16    134<L>  |  95<L>  |  11  ----------------------------<  89  4.0   |  27  |  0.56    Ca    9.1      2019 18:40  Phos  2.1     06-16  Mg     1.9     06-16    TPro  7.1  /  Alb  3.0<L>  /  TBili  0.8  /  DBili  x   /  AST  66<H>  /  ALT  44<H>  /  AlkPhos  221<H>  06-16    PT/INR - ( 15 Davin 2019 14:15 )   PT: 14.5 SEC;   INR: 1.26          PTT - ( 15 Davin 2019 14:15 )  PTT:30.2 SEC  Urinalysis Basic - ( 15 Davin 2019 16:05 )    Color: DARK YELLOW / Appearance: Lt TURBID / S.031 / pH: 6.5  Gluc: NEGATIVE / Ketone: SMALL  / Bili: TRACE / Urobili: TRACE   Blood: NEGATIVE / Protein: 100 / Nitrite: NEGATIVE   Leuk Esterase: SMALL / RBC: 0-2 / WBC 6-10   Sq Epi: FEW / Non Sq Epi: x / Bacteria: NEGATIVE        RADIOLOGY & ADDITIONAL STUDIES:

## 2019-06-17 NOTE — CHART NOTE - NSCHARTNOTEFT_GEN_A_CORE
Preop Dx: Acute cholecystitis  Surgeon: Laure Álvarez  Procedure: Lap camryn    Vital Signs Last 24 Hrs  T(C): 37.5 (16 Jun 2019 22:03), Max: 37.7 (16 Jun 2019 07:20)  T(F): 99.5 (16 Jun 2019 22:03), Max: 99.9 (16 Jun 2019 07:20)  HR: 81 (16 Jun 2019 22:03) (72 - 123)  BP: 114/66 (16 Jun 2019 22:03) (90/61 - 119/74)  BP(mean): --  RR: 16 (16 Jun 2019 22:03) (16 - 20)  SpO2: 93% (16 Jun 2019 22:03) (93% - 99%)                        13.6   13.58 )-----------( 235      ( 16 Jun 2019 06:24 )             44.3     06-16    134<L>  |  95<L>  |  11  ----------------------------<  89  4.0   |  27  |  0.56    Ca    9.1      16 Jun 2019 18:40  Phos  2.1     06-16  Mg     1.9     06-16    TPro  7.1  /  Alb  3.0<L>  /  TBili  0.8  /  DBili  x   /  AST  66<H>  /  ALT  44<H>  /  AlkPhos  221<H>  06-16    PT/INR - ( 15 Davin 2019 14:15 )   PT: 14.5 SEC;   INR: 1.26          PTT - ( 15 Davin 2019 14:15 )  PTT:30.2 SEC  Daily     Daily     EKG: NSR, cleared by cardiolgoy  CXR: possible atelectasis  Type and Screen: 6/15, ordered for am        A/P: 79y Female     - OR 6/17 for lap camryn with Dr. Plaza  - NPO past midnight, except medications  - IVF while NPO  - Consent signed and in chart  - Cards clearance for OR

## 2019-06-18 ENCOUNTER — TRANSCRIPTION ENCOUNTER (OUTPATIENT)
Age: 79
End: 2019-06-18

## 2019-06-18 VITALS
RESPIRATION RATE: 17 BRPM | SYSTOLIC BLOOD PRESSURE: 115 MMHG | TEMPERATURE: 98 F | HEART RATE: 86 BPM | DIASTOLIC BLOOD PRESSURE: 72 MMHG | OXYGEN SATURATION: 97 %

## 2019-06-18 LAB
-  AMIKACIN: SIGNIFICANT CHANGE UP
-  AMPICILLIN/SULBACTAM: SIGNIFICANT CHANGE UP
-  AMPICILLIN: SIGNIFICANT CHANGE UP
-  AZTREONAM: SIGNIFICANT CHANGE UP
-  CEFAZOLIN: SIGNIFICANT CHANGE UP
-  CEFEPIME: SIGNIFICANT CHANGE UP
-  CEFOXITIN: SIGNIFICANT CHANGE UP
-  CEFTAZIDIME: SIGNIFICANT CHANGE UP
-  CEFTRIAXONE: SIGNIFICANT CHANGE UP
-  CIPROFLOXACIN: SIGNIFICANT CHANGE UP
-  ERTAPENEM: SIGNIFICANT CHANGE UP
-  GENTAMICIN: SIGNIFICANT CHANGE UP
-  IMIPENEM: SIGNIFICANT CHANGE UP
-  LEVOFLOXACIN: SIGNIFICANT CHANGE UP
-  MEROPENEM: SIGNIFICANT CHANGE UP
-  NITROFURANTOIN: SIGNIFICANT CHANGE UP
-  PIPERACILLIN/TAZOBACTAM: SIGNIFICANT CHANGE UP
-  TIGECYCLINE: SIGNIFICANT CHANGE UP
-  TOBRAMYCIN: SIGNIFICANT CHANGE UP
-  TRIMETHOPRIM/SULFAMETHOXAZOLE: SIGNIFICANT CHANGE UP
ALBUMIN SERPL ELPH-MCNC: 3 G/DL — LOW (ref 3.3–5)
ALP SERPL-CCNC: 165 U/L — HIGH (ref 40–120)
ALT FLD-CCNC: 28 U/L — SIGNIFICANT CHANGE UP (ref 4–33)
ANION GAP SERPL CALC-SCNC: 10 MMO/L — SIGNIFICANT CHANGE UP (ref 7–14)
AST SERPL-CCNC: 21 U/L — SIGNIFICANT CHANGE UP (ref 4–32)
BACTERIA UR CULT: SIGNIFICANT CHANGE UP
BILIRUB SERPL-MCNC: 0.5 MG/DL — SIGNIFICANT CHANGE UP (ref 0.2–1.2)
BUN SERPL-MCNC: 4 MG/DL — LOW (ref 7–23)
CALCIUM SERPL-MCNC: 9.1 MG/DL — SIGNIFICANT CHANGE UP (ref 8.4–10.5)
CHLORIDE SERPL-SCNC: 98 MMOL/L — SIGNIFICANT CHANGE UP (ref 98–107)
CO2 SERPL-SCNC: 31 MMOL/L — SIGNIFICANT CHANGE UP (ref 22–31)
CREAT SERPL-MCNC: 0.52 MG/DL — SIGNIFICANT CHANGE UP (ref 0.5–1.3)
GLUCOSE SERPL-MCNC: 120 MG/DL — HIGH (ref 70–99)
HCT VFR BLD CALC: 38.8 % — SIGNIFICANT CHANGE UP (ref 34.5–45)
HGB BLD-MCNC: 12.1 G/DL — SIGNIFICANT CHANGE UP (ref 11.5–15.5)
MAGNESIUM SERPL-MCNC: 1.9 MG/DL — SIGNIFICANT CHANGE UP (ref 1.6–2.6)
MCHC RBC-ENTMCNC: 27.4 PG — SIGNIFICANT CHANGE UP (ref 27–34)
MCHC RBC-ENTMCNC: 31.2 % — LOW (ref 32–36)
MCV RBC AUTO: 87.8 FL — SIGNIFICANT CHANGE UP (ref 80–100)
METHOD TYPE: SIGNIFICANT CHANGE UP
NRBC # FLD: 0 K/UL — SIGNIFICANT CHANGE UP (ref 0–0)
ORGANISM # SPEC MICROSCOPIC CNT: SIGNIFICANT CHANGE UP
ORGANISM # SPEC MICROSCOPIC CNT: SIGNIFICANT CHANGE UP
PHOSPHATE SERPL-MCNC: 1.5 MG/DL — LOW (ref 2.5–4.5)
PLATELET # BLD AUTO: 226 K/UL — SIGNIFICANT CHANGE UP (ref 150–400)
PMV BLD: 11.2 FL — SIGNIFICANT CHANGE UP (ref 7–13)
POTASSIUM SERPL-MCNC: 3.8 MMOL/L — SIGNIFICANT CHANGE UP (ref 3.5–5.3)
POTASSIUM SERPL-SCNC: 3.8 MMOL/L — SIGNIFICANT CHANGE UP (ref 3.5–5.3)
PROT SERPL-MCNC: 6.8 G/DL — SIGNIFICANT CHANGE UP (ref 6–8.3)
RBC # BLD: 4.42 M/UL — SIGNIFICANT CHANGE UP (ref 3.8–5.2)
RBC # FLD: 14.7 % — HIGH (ref 10.3–14.5)
SODIUM SERPL-SCNC: 139 MMOL/L — SIGNIFICANT CHANGE UP (ref 135–145)
WBC # BLD: 7.35 K/UL — SIGNIFICANT CHANGE UP (ref 3.8–10.5)
WBC # FLD AUTO: 7.35 K/UL — SIGNIFICANT CHANGE UP (ref 3.8–10.5)

## 2019-06-18 PROCEDURE — 99231 SBSQ HOSP IP/OBS SF/LOW 25: CPT | Mod: GC

## 2019-06-18 RX ORDER — MAGNESIUM SULFATE 500 MG/ML
1 VIAL (ML) INJECTION ONCE
Refills: 0 | Status: COMPLETED | OUTPATIENT
Start: 2019-06-18 | End: 2019-06-18

## 2019-06-18 RX ORDER — POTASSIUM PHOSPHATE, MONOBASIC POTASSIUM PHOSPHATE, DIBASIC 236; 224 MG/ML; MG/ML
30 INJECTION, SOLUTION INTRAVENOUS ONCE
Refills: 0 | Status: COMPLETED | OUTPATIENT
Start: 2019-06-18 | End: 2019-06-18

## 2019-06-18 RX ADMIN — AMLODIPINE BESYLATE 5 MILLIGRAM(S): 2.5 TABLET ORAL at 06:11

## 2019-06-18 RX ADMIN — Medication 100 GRAM(S): at 10:43

## 2019-06-18 RX ADMIN — POTASSIUM PHOSPHATE, MONOBASIC POTASSIUM PHOSPHATE, DIBASIC 83.33 MILLIMOLE(S): 236; 224 INJECTION, SOLUTION INTRAVENOUS at 11:59

## 2019-06-18 RX ADMIN — ENOXAPARIN SODIUM 40 MILLIGRAM(S): 100 INJECTION SUBCUTANEOUS at 13:31

## 2019-06-18 RX ADMIN — Medication 0.12 MILLIGRAM(S): at 06:11

## 2019-06-18 RX ADMIN — BISOPROLOL FUMARATE 5 MILLIGRAM(S): 10 TABLET, FILM COATED ORAL at 06:11

## 2019-06-18 RX ADMIN — PIPERACILLIN AND TAZOBACTAM 25 GRAM(S): 4; .5 INJECTION, POWDER, LYOPHILIZED, FOR SOLUTION INTRAVENOUS at 06:11

## 2019-06-18 NOTE — DISCHARGE NOTE PROVIDER - CARE PROVIDERS DIRECT ADDRESSES
[email or web address]

## 2019-06-18 NOTE — DISCHARGE NOTE PROVIDER - NSDCFUADDAPPT_GEN_ALL_CORE_FT
Please follow-up with your cardiologist or our cardiology clinic within 1 - 2 weeks following discharge for further optimization and discussion of your care.

## 2019-06-18 NOTE — DISCHARGE NOTE NURSING/CASE MANAGEMENT/SOCIAL WORK - NSDCPNDISPN_GEN_ALL_CORE
Side effects of pain management treatment/Activities of daily living, including home environment that might     exacerbate pain or reduce effectiveness of the pain management plan of care as well as strategies to address these issues/Safe use, storage and disposal of opioids when prescribed/Education provided on the pain management plan of care/Opioids not applicable/not prescribed

## 2019-06-18 NOTE — DISCHARGE NOTE PROVIDER - HOSPITAL COURSE
79F hx of HTN who presents to [facility] 6/15/19 with 2d RUQ abdominal pain associated with nausea/vomiting. She had fever and chills at home and is febrile in ED. She has not had pain like this before. She denies surgical history. She denies family hx of gallstones. She had been passing flatus and BMs normally.         Admission ultrasound (6/15/19): Acute cholesystitis--Cholelithiasis in a nondistended gallbladder with mild wall thickening and trace pericholecystic fluid. Positive sonographic Lopez's sign.    In the ED received Zosyn and 1L NS. She was admitted to the surgical service.         6/16: Pain improved overnight, tenderness decreased- possible outpatient cholecystectomy was discussed. Diet was advanced.     - Patient had an episode of desaturation, requiring 4L of oxygen and tachycardia. EKG was sinus tachycardia. Troponin resulted was elevated to 80.     -Cardiology was consulted but no symptoms were found concerning for acute myocardial injury, heart failure or acute lung pathology.  Her mildly positive troponin was deemed likely due to a small type II event in the setting of an acute illness. No signs of plaque rupture or acute ischemia; not consistent with ACS. She was cleared for operative intervention.         Decision was made that given questions about cardiac status (unclear use of digoxin and bisoprolol) and mild improvement in pain, conservation management was ensued with plan for interval lap cholecystectomy in 6-8wks.        Diet was advanced as tolerated. Oxygen was weaned. Pt currently ambulating, voiding, tolerating a regular diet, without significant pain. Patient is stable for discharge home to follow up as an outpatient, instructed to call to schedule appointment.

## 2019-06-18 NOTE — PROGRESS NOTE ADULT - SUBJECTIVE AND OBJECTIVE BOX
GENERAL SURGERY DAILY PROGRESS NOTE:     Subjective:  Pt seen and examined. No acute events overnight. Pain controlled. Nonop management for now to optimize patient. Tolerating cld.     Objective:    MEDICATIONS  (STANDING):  amLODIPine   Tablet 5 milliGRAM(s) Oral daily  bisoprolol   Tablet 5 milliGRAM(s) Oral daily  dextrose 5% + sodium chloride 0.45%. 1000 milliLiter(s) (50 mL/Hr) IV Continuous <Continuous>  digoxin     Tablet 0.125 milliGRAM(s) Oral daily  enoxaparin Injectable 40 milliGRAM(s) SubCutaneous daily  piperacillin/tazobactam IVPB. 3.375 Gram(s) IV Intermittent every 8 hours    MEDICATIONS  (PRN):      Vital Signs Last 24 Hrs  T(C): 36.6 (18 Jun 2019 06:05), Max: 37.1 (17 Jun 2019 15:01)  T(F): 97.9 (18 Jun 2019 06:05), Max: 98.7 (17 Jun 2019 15:01)  HR: 89 (18 Jun 2019 06:05) (82 - 92)  BP: 126/81 (18 Jun 2019 06:05) (106/64 - 139/90)  BP(mean): --  RR: 18 (18 Jun 2019 06:05) (17 - 20)  SpO2: 96% (18 Jun 2019 06:05) (93% - 99%)    I&O's Detail    16 Jun 2019 07:01  -  17 Jun 2019 07:00  --------------------------------------------------------  IN:    IV PiggyBack: 475 mL    lactated ringers.: 1750 mL  Total IN: 2225 mL    OUT:  Total OUT: 0 mL    Total NET: 2225 mL      17 Jun 2019 07:01  -  18 Jun 2019 06:14  --------------------------------------------------------  IN:    dextrose 5% + sodium chloride 0.45%.: 350 mL    IV PiggyBack: 275 mL    lactated ringers.: 350 mL    Oral Fluid: 500 mL  Total IN: 1475 mL    OUT:    Voided: 2750 mL  Total OUT: 2750 mL    Total NET: -1275 mL    PHYSICAL EXAM  NAD, awake and alert  Respirations nonlabored  Abdomen soft, RUQ tenderness, nondistended  No guarding or rebound tenderness    Daily     Daily     LABS:                        11.7   7.36  )-----------( 210      ( 17 Jun 2019 05:54 )             38.0     06-17    134<L>  |  94<L>  |  10  ----------------------------<  88  3.8   |  29  |  0.59    Ca    8.6      17 Jun 2019 05:54  Phos  3.4     06-17  Mg     2.0     06-17    TPro  6.4  /  Alb  2.8<L>  /  TBili  0.7  /  DBili  x   /  AST  42<H>  /  ALT  34<H>  /  AlkPhos  179<H>  06-17    PT/INR - ( 17 Jun 2019 05:54 )   PT: 13.7 SEC;   INR: 1.23          PTT - ( 17 Jun 2019 05:54 )  PTT:29.2 SEC      RADIOLOGY & ADDITIONAL STUDIES:

## 2019-06-18 NOTE — DISCHARGE NOTE PROVIDER - CARE PROVIDER_API CALL
Alba Barros)  Dietitian nutritionist; Surgery; Surgical Critical Care  [address], [address], [place] [address]  Phone: [phone]  Fax: [phone]  Follow Up Time:     Prasanna Rivera)  Cardiovascular Disease; Nuclear Cardiology  [id number] [place] [address]  Phone: [phone]  Fax: [phone]  Follow Up Time:

## 2019-06-18 NOTE — DISCHARGE NOTE PROVIDER - NSDCFUADDINST_GEN_ALL_CORE_FT
DIET: Low fat diet  NOTIFY YOUR SURGEON IF: You have any fever (over 100.4 F) or chills, persistent nausea/vomiting, persistent diarrhea, or if your pain is not controlled on your discharge pain medications.  FOLLOW-UP: Please follow up with your primary care physician in one week regarding your hospitalization. Please follow-up with your surgeon, Dr. Barros within 7 days following discharge- please call to schedule an appointment.

## 2019-06-18 NOTE — PROGRESS NOTE ADULT - ASSESSMENT
79F acute cholecystitis, plan for nonop management for now.     - cld  - f/u cardiolgoy  - IV zosyn  - IVF resuscitation  - pain control  - call pmd regarding medication - bb and digoxin  - VTE ppx    B Team [id number]

## 2019-06-18 NOTE — DISCHARGE NOTE NURSING/CASE MANAGEMENT/SOCIAL WORK - NSDCDPATPORTLINK_GEN_ALL_CORE
You can access the [facility], offered by [facility], by registering with the following website: [email or web address]

## 2019-06-18 NOTE — PROGRESS NOTE ADULT - ATTENDING COMMENTS
Denies pain, improved overnight  Soft mildly tender  Continue IVF , IV abx  May progress diet  Discuss possible outpatient cholecystectomy
I have personally interviewed and examined this patient, reviewed pertinent labs and imaging, and discussed the case with colleagues, residents, and physician assistants on B Team rounds.    The active care issues are:  1. acute calculous cholecystitis approaching 96hrs of symptoms, increasing hazard of operative intervention  2. poorly defined cardiac disease    Appreciate cards input but will try to contact PCP to better understand why she is on the cardiac meds she is on before unilaterally discontinuing.  Given questions about cardiac status and mild improvement in pain, will try nonop management with interval lap CCY in 6-8wks.  Can use perc camryn tube as an adjunct to antibiotics if clinically deteriorates.    The Acute Care Surgery (B Team) Attending Group Practice:  Dr. Alba Barros, Dr. Viraj Plaza, Dr. Agnes Montejo, Dr. Farrukh Spears    urgent issues - spectra [phone] or [phone]  nonurgent issues - [phone]  patient appointments or afterhours - [phone]
I have personally interviewed and examined this patient, reviewed pertinent labs and imaging, and discussed the case with colleagues, residents, and physician assistants on B Team rounds.    The active care issues are:  1. acute cholecystitis, improving with antibiotics    Plan for discharge on extended course of antibiotics.  Outpatient cardiology f/u and surgical planning for interval lap camryn is symptoms persist.    The Acute Care Surgery (B Team) Attending Group Practice:  Dr. Jayden Ramos, Dr. Alba Barros, Dr. Viraj Plaza, Dr. Agnes Montejo, Dr. Farrukh Spears    urgent issues - spectra [phone] or [phone]  nonurgent issues - [phone]  patient appointments or afterhours - [phone]

## 2019-06-18 NOTE — DISCHARGE NOTE PROVIDER - NSDCCPCAREPLAN_GEN_ALL_CORE_FT
PRINCIPAL DISCHARGE DIAGNOSIS  Diagnosis: Cholecystitis  Assessment and Plan of Treatment:       SECONDARY DISCHARGE DIAGNOSES  Diagnosis: Sepsis  Assessment and Plan of Treatment:

## 2019-06-19 PROBLEM — I10 ESSENTIAL (PRIMARY) HYPERTENSION: Chronic | Status: ACTIVE | Noted: 2019-06-15

## 2019-06-19 PROBLEM — Z00.00 ENCOUNTER FOR PREVENTIVE HEALTH EXAMINATION: Status: ACTIVE | Noted: 2019-06-19

## 2019-06-20 LAB
BACTERIA BLD CULT: SIGNIFICANT CHANGE UP
BACTERIA BLD CULT: SIGNIFICANT CHANGE UP

## 2019-06-26 ENCOUNTER — RECORD ABSTRACTING (OUTPATIENT)
Age: 79
End: 2019-06-26

## 2019-06-26 DIAGNOSIS — Z86.19 PERSONAL HISTORY OF OTHER INFECTIOUS AND PARASITIC DISEASES: ICD-10-CM

## 2019-06-26 DIAGNOSIS — Z78.9 OTHER SPECIFIED HEALTH STATUS: ICD-10-CM

## 2019-06-26 RX ORDER — PNV NO.95/FERROUS FUM/FOLIC AC 28MG-0.8MG
TABLET ORAL DAILY
Refills: 0 | Status: ACTIVE | COMMUNITY

## 2019-06-26 RX ORDER — BISOPROLOL FUMARATE 5 MG/1
5 TABLET, FILM COATED ORAL DAILY
Refills: 0 | Status: ACTIVE | COMMUNITY

## 2019-06-26 RX ORDER — CALCIUM CARBONATE/VITAMIN D3 500MG-5MCG
500-5 TABLET ORAL TWICE DAILY
Refills: 0 | Status: ACTIVE | COMMUNITY

## 2019-06-26 RX ORDER — DIGOXIN 125 UG/1
125 TABLET ORAL DAILY
Refills: 0 | Status: ACTIVE | COMMUNITY

## 2019-06-26 RX ORDER — AMLODIPINE BESYLATE 5 MG/1
5 TABLET ORAL DAILY
Refills: 0 | Status: ACTIVE | COMMUNITY

## 2019-06-26 RX ORDER — OMEPRAZOLE 20 MG/1
20 TABLET, DELAYED RELEASE ORAL DAILY
Refills: 0 | Status: ACTIVE | COMMUNITY

## 2019-06-26 RX ORDER — BENZONATATE 200 MG/1
200 CAPSULE ORAL DAILY
Refills: 0 | Status: ACTIVE | COMMUNITY

## 2019-07-03 ENCOUNTER — APPOINTMENT (OUTPATIENT)
Dept: CARDIOLOGY | Facility: CLINIC | Age: 79
End: 2019-07-03
Payer: MEDICARE

## 2019-07-03 VITALS
WEIGHT: 160 LBS | RESPIRATION RATE: 16 BRPM | HEIGHT: 59 IN | OXYGEN SATURATION: 97 % | HEART RATE: 66 BPM | SYSTOLIC BLOOD PRESSURE: 130 MMHG | BODY MASS INDEX: 32.25 KG/M2 | DIASTOLIC BLOOD PRESSURE: 85 MMHG

## 2019-07-03 DIAGNOSIS — K81.9 CHOLECYSTITIS, UNSPECIFIED: ICD-10-CM

## 2019-07-03 DIAGNOSIS — R74.8 ABNORMAL LEVELS OF OTHER SERUM ENZYMES: ICD-10-CM

## 2019-07-03 DIAGNOSIS — Z01.810 ENCOUNTER FOR PREPROCEDURAL CARDIOVASCULAR EXAMINATION: ICD-10-CM

## 2019-07-03 DIAGNOSIS — I10 ESSENTIAL (PRIMARY) HYPERTENSION: ICD-10-CM

## 2019-07-03 PROCEDURE — 99205 OFFICE O/P NEW HI 60 MIN: CPT

## 2019-07-03 PROCEDURE — 93000 ELECTROCARDIOGRAM COMPLETE: CPT

## 2019-07-08 ENCOUNTER — APPOINTMENT (OUTPATIENT)
Dept: TRAUMA SURGERY | Facility: CLINIC | Age: 79
End: 2019-07-08
Payer: MEDICARE

## 2019-07-08 VITALS
BODY MASS INDEX: 32.25 KG/M2 | SYSTOLIC BLOOD PRESSURE: 133 MMHG | HEART RATE: 82 BPM | TEMPERATURE: 98 F | DIASTOLIC BLOOD PRESSURE: 82 MMHG | HEIGHT: 59 IN | WEIGHT: 160 LBS

## 2019-07-08 PROCEDURE — 99213 OFFICE O/P EST LOW 20 MIN: CPT | Mod: 57

## 2019-07-09 NOTE — PHYSICAL EXAM
[Abdomen Tenderness] : ~T ~M Abdominal tenderness [Alert] : alert [Calm] : calm [de-identified] : obese older woman [de-identified] : anicteric [de-identified] : protuberant, RUQ and right flank tenderness, difficult to assess for masses due to pain, no scars/hernias appreciated [de-identified] : supple [de-identified] : no calf tenderness, no ankle edema [de-identified] : no jaundice

## 2019-07-09 NOTE — VITALS
[Aching] : aching [Exhausting] : exhausting [Continuous] : continuous [FreeTextEntry3] : right posterior flank [FreeTextEntry1] : drinking water [FreeTextEntry2] : eating, moving

## 2019-07-09 NOTE — ASSESSMENT
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  I explained to her via her daughter that I am concerned about her heart health but she feels that the pain is too severe and would like to proceed with surgical management of her gallbladder disease.\par \par Risks, benefits, alternatives and recovery from laparoscopic possible open cholecystectomy including bleeding, biliary tract injury and persistent pain (if pain is due to bone cyst instead) were explained to her and she had an opportunity to ask questions to her satisfaction.\par \par Schedule PST, lap camryn ASAP.  Cards clearance in chart.\par \par More than 50% of this 15min visit was spent in education, counselling and coordination of care.

## 2019-07-09 NOTE — HISTORY OF PRESENT ILLNESS
[de-identified] : 79 year-old woman with hypertension recently hospitalized at [facility] mid-June for acute calculous cholecystitis.  Managed medically with plan for interval lap camryn once cardiac status elucidated. [de-identified] : Still feeling RUQ pain despite nearly completing extended course of antibiotics.  Relieved by drinking copious amounts of water per her daughter (who accompanied her to the visit and declined hospital  service, preferring to translate Tajik-English herself, with her mother's agreement).  Reports being seen by cardiologist last week and "cleared" for surgery.

## 2019-07-11 ENCOUNTER — OUTPATIENT (OUTPATIENT)
Dept: OUTPATIENT SERVICES | Facility: HOSPITAL | Age: 79
LOS: 1 days | End: 2019-07-11

## 2019-07-11 VITALS
SYSTOLIC BLOOD PRESSURE: 110 MMHG | TEMPERATURE: 98 F | HEIGHT: 58 IN | OXYGEN SATURATION: 96 % | DIASTOLIC BLOOD PRESSURE: 70 MMHG | WEIGHT: 160.06 LBS | RESPIRATION RATE: 16 BRPM | HEART RATE: 69 BPM

## 2019-07-11 DIAGNOSIS — K81.0 ACUTE CHOLECYSTITIS: ICD-10-CM

## 2019-07-11 DIAGNOSIS — K80.20 CALCULUS OF GALLBLADDER WITHOUT CHOLECYSTITIS WITHOUT OBSTRUCTION: ICD-10-CM

## 2019-07-11 DIAGNOSIS — Z98.890 OTHER SPECIFIED POSTPROCEDURAL STATES: Chronic | ICD-10-CM

## 2019-07-11 RX ORDER — DIGOXIN 250 MCG
1 TABLET ORAL
Qty: 0 | Refills: 0 | DISCHARGE

## 2019-07-11 RX ORDER — SODIUM CHLORIDE 9 MG/ML
1000 INJECTION, SOLUTION INTRAVENOUS
Refills: 0 | Status: DISCONTINUED | OUTPATIENT
Start: 2019-07-17 | End: 2019-08-01

## 2019-07-11 NOTE — H&P PST ADULT - NSICDXPASTMEDICALHX_GEN_ALL_CORE_FT
PAST MEDICAL HISTORY:  Gall stones     GERD (gastroesophageal reflux disease)     HTN (hypertension)

## 2019-07-11 NOTE — H&P PST ADULT - ASSESSMENT
79F hx of HTN with h/o 2day RUQ abdominal pain associated with nausea/vomiting. Ultrasound showed galls stones. Pt presents for preop eval to have laparoscopic cholecystectomy, possible open on 7/17/19.

## 2019-07-11 NOTE — H&P PST ADULT - NSANTHOSAYNRD_GEN_A_CORE
never tested/No. NIMA screening performed.  STOP BANG Legend: 0-2 = LOW Risk; 3-4 = INTERMEDIATE Risk; 5-8 = HIGH Risk

## 2019-07-11 NOTE — H&P PST ADULT - NSICDXPROBLEM_GEN_ALL_CORE_FT
PROBLEM DIAGNOSES  Problem: Gall stones  Assessment and Plan: Pt scheduled for lap camryn, possible open on 7/17/19.   labs and EKG done in [facility] and copy in chart.   Preop instructions provided to pt.  Chlorhexidine scrubs provided to pt with instructions.      R/O PROBLEM DIAGNOSES  Problem: Snoring  Assessment and Plan: NIMA precautions recommended.  OR booking norified via fax

## 2019-07-11 NOTE — H&P PST ADULT - RS GEN PE MLT RESP DETAILS PC
airway patent/no rhonchi/no wheezes/no rales/clear to auscultation bilaterally/respirations non-labored

## 2019-07-15 PROBLEM — K80.20 CALCULUS OF GALLBLADDER WITHOUT CHOLECYSTITIS WITHOUT OBSTRUCTION: Chronic | Status: ACTIVE | Noted: 2019-07-11

## 2019-07-15 PROBLEM — K21.9 GASTRO-ESOPHAGEAL REFLUX DISEASE WITHOUT ESOPHAGITIS: Chronic | Status: ACTIVE | Noted: 2019-07-11

## 2019-07-16 ENCOUNTER — TRANSCRIPTION ENCOUNTER (OUTPATIENT)
Age: 79
End: 2019-07-16

## 2019-07-17 ENCOUNTER — APPOINTMENT (OUTPATIENT)
Dept: TRAUMA SURGERY | Facility: HOSPITAL | Age: 79
End: 2019-07-17

## 2019-07-17 ENCOUNTER — OUTPATIENT (OUTPATIENT)
Dept: OUTPATIENT SERVICES | Facility: HOSPITAL | Age: 79
LOS: 1 days | Discharge: HOME | End: 2019-07-17
Payer: MEDICARE

## 2019-07-17 ENCOUNTER — RESULT REVIEW (OUTPATIENT)
Age: 79
End: 2019-07-17

## 2019-07-17 VITALS
SYSTOLIC BLOOD PRESSURE: 128 MMHG | RESPIRATION RATE: 16 BRPM | HEART RATE: 99 BPM | DIASTOLIC BLOOD PRESSURE: 68 MMHG | OXYGEN SATURATION: 100 %

## 2019-07-17 VITALS
WEIGHT: 160.06 LBS | DIASTOLIC BLOOD PRESSURE: 77 MMHG | SYSTOLIC BLOOD PRESSURE: 139 MMHG | OXYGEN SATURATION: 96 % | HEART RATE: 74 BPM | HEIGHT: 58 IN | TEMPERATURE: 98 F | RESPIRATION RATE: 16 BRPM

## 2019-07-17 DIAGNOSIS — K81.0 ACUTE CHOLECYSTITIS: ICD-10-CM

## 2019-07-17 DIAGNOSIS — Z98.890 OTHER SPECIFIED POSTPROCEDURAL STATES: Chronic | ICD-10-CM

## 2019-07-17 PROCEDURE — 88304 TISSUE EXAM BY PATHOLOGIST: CPT | Mod: 26

## 2019-07-17 PROCEDURE — 47562 LAPAROSCOPIC CHOLECYSTECTOMY: CPT | Mod: GC

## 2019-07-17 RX ORDER — ACETAMINOPHEN 500 MG
2 TABLET ORAL
Qty: 0 | Refills: 0 | DISCHARGE
Start: 2019-07-17

## 2019-07-17 RX ORDER — ACETAMINOPHEN 500 MG
1000 TABLET ORAL ONCE
Refills: 0 | Status: DISCONTINUED | OUTPATIENT
Start: 2019-07-17 | End: 2019-08-01

## 2019-07-17 RX ORDER — ONDANSETRON 8 MG/1
4 TABLET, FILM COATED ORAL ONCE
Refills: 0 | Status: DISCONTINUED | OUTPATIENT
Start: 2019-07-17 | End: 2019-08-01

## 2019-07-17 RX ORDER — OXYCODONE HYDROCHLORIDE 5 MG/1
1 TABLET ORAL
Qty: 10 | Refills: 0
Start: 2019-07-17

## 2019-07-17 RX ORDER — OXYCODONE HYDROCHLORIDE 5 MG/1
5 TABLET ORAL EVERY 4 HOURS
Refills: 0 | Status: DISCONTINUED | OUTPATIENT
Start: 2019-07-17 | End: 2019-07-17

## 2019-07-17 RX ORDER — ACETAMINOPHEN 500 MG
650 TABLET ORAL EVERY 6 HOURS
Refills: 0 | Status: DISCONTINUED | OUTPATIENT
Start: 2019-07-17 | End: 2019-08-01

## 2019-07-17 RX ORDER — AMLODIPINE BESYLATE 2.5 MG/1
1 TABLET ORAL
Qty: 0 | Refills: 0 | DISCHARGE

## 2019-07-17 RX ORDER — HYDROMORPHONE HYDROCHLORIDE 2 MG/ML
0.5 INJECTION INTRAMUSCULAR; INTRAVENOUS; SUBCUTANEOUS
Refills: 0 | Status: DISCONTINUED | OUTPATIENT
Start: 2019-07-17 | End: 2019-07-17

## 2019-07-17 RX ORDER — OMEPRAZOLE 10 MG/1
1 CAPSULE, DELAYED RELEASE ORAL
Qty: 0 | Refills: 0 | DISCHARGE

## 2019-07-17 RX ORDER — BISOPROLOL FUMARATE 10 MG/1
1 TABLET, FILM COATED ORAL
Qty: 0 | Refills: 0 | DISCHARGE

## 2019-07-17 RX ORDER — FENTANYL CITRATE 50 UG/ML
25 INJECTION INTRAVENOUS
Refills: 0 | Status: DISCONTINUED | OUTPATIENT
Start: 2019-07-17 | End: 2019-07-17

## 2019-07-17 NOTE — ASU DISCHARGE PLAN (ADULT/PEDIATRIC) - CALL YOUR DOCTOR IF YOU HAVE ANY OF THE FOLLOWING:
Wound/Surgical Site with redness, or foul smelling discharge or pus/Pain not relieved by Medications/Swelling that gets worse/Bleeding that does not stop

## 2019-07-17 NOTE — ASU PREOP CHECKLIST - 1.
Pt Tajik speaking,  phone utilized to verify daughter Donna may translate, see flow sheet for  details

## 2019-07-17 NOTE — ASU DISCHARGE PLAN (ADULT/PEDIATRIC) - CARE PROVIDER_API CALL
Alba Barros)  Dietitian nutritionist; Surgery; Surgical Critical Care  [address] [facility], [address] [address]  Phone: [phone]  Fax: [phone]  Follow Up Time:

## 2019-07-17 NOTE — ASU DISCHARGE PLAN (ADULT/PEDIATRIC) - NURSING INSTRUCTIONS
No heavy lifting or straining. DO NOT take any Tylenol (Acetaminophen) or narcotics containing Tylenol until after  ___11 pm___ . You received Tylenol during your operation and it can cause damage to your liver if too much is taken within a 24 hour time period. No motrin or advil until after 11 pm. If any bleeding that does not stop, pain not being relieved with medication or redness or swelling at incision sites, notify MD. Follow up with MD for return appointment.

## 2019-07-31 PROBLEM — I10 HTN (HYPERTENSION): Status: ACTIVE | Noted: 2019-06-26

## 2019-07-31 PROBLEM — K81.9 CHOLECYSTITIS: Status: ACTIVE | Noted: 2019-06-26

## 2019-07-31 PROBLEM — R74.8 ELEVATED TROPONIN: Status: ACTIVE | Noted: 2019-07-31

## 2019-07-31 PROBLEM — Z01.810 PRE-OPERATIVE CARDIOVASCULAR EXAMINATION: Status: ACTIVE | Noted: 2019-07-31

## 2019-07-31 NOTE — PHYSICAL EXAM
[General Appearance - Well Developed] : well developed [Normal Appearance] : normal appearance [Well Groomed] : well groomed [General Appearance - Well Nourished] : well nourished [No Deformities] : no deformities [General Appearance - In No Acute Distress] : no acute distress [Normal Conjunctiva] : the conjunctiva exhibited no abnormalities [Eyelids - No Xanthelasma] : the eyelids demonstrated no xanthelasmas [Normal Oral Mucosa] : normal oral mucosa [No Oral Pallor] : no oral pallor [No Oral Cyanosis] : no oral cyanosis [Normal Jugular Venous A Waves Present] : normal jugular venous A waves present [Normal Jugular Venous V Waves Present] : normal jugular venous V waves present [No Jugular Venous Longo A Waves] : no jugular venous longo A waves [Heart Rate And Rhythm] : heart rate and rhythm were normal [Heart Sounds] : normal S1 and S2 [Murmurs] : no murmurs present [Respiration, Rhythm And Depth] : normal respiratory rhythm and effort [Exaggerated Use Of Accessory Muscles For Inspiration] : no accessory muscle use [Auscultation Breath Sounds / Voice Sounds] : lungs were clear to auscultation bilaterally [Abdomen Soft] : soft [Abdomen Mass (___ Cm)] : no abdominal mass palpated [Abnormal Walk] : normal gait [Gait - Sufficient For Exercise Testing] : the gait was sufficient for exercise testing [Nail Clubbing] : no clubbing of the fingernails [Cyanosis, Localized] : no localized cyanosis [Petechial Hemorrhages (___cm)] : no petechial hemorrhages [Skin Color & Pigmentation] : normal skin color and pigmentation [] : no rash [No Venous Stasis] : no venous stasis [Skin Lesions] : no skin lesions [No Skin Ulcers] : no skin ulcer [No Xanthoma] : no  xanthoma was observed [Oriented To Time, Place, And Person] : oriented to person, place, and time [Affect] : the affect was normal [Mood] : the mood was normal [No Anxiety] : not feeling anxious [FreeTextEntry1] : Mild RUQ tenderness to palpitation

## 2019-07-31 NOTE — REASON FOR VISIT
[FreeTextEntry1] : Surgeon Alba Barros\par \par Planned surgeries:\par Lap cholecystectomy\par Renal stone extraction\par \par Patient is a 79 year-old woman with hypertension recently hospitalized at [facility] mid-June for acute calculous cholecystitis. Managed medically with plan for interval laparoscopic cholecystectomy.\par \par Since discharge, the patient reports having intermittent bouts of RUQ abdominal discomfort.  No new cardiovascular complaints. Although mildly positive cardiac troponins were noted during hospitalization, she likely had only a small type II event (demand ischemia) related to the setting of an acute illness. She did not have acute coronary syndrome. Echocardiogram performed during her hospitalization demonstrated normal biventricular systolic function (see below).\par \par EKG demonstrates SR.  Physical examination was unremarkable.  She appears euvolemic.  No active cardiac arrhythmias.\par \par From the cardiac perspective, Ms. Pineda may proceed with planned elective surgeries (lap camryn and renal stone extraction) without the need for additional cardiac testing or risk stratification.\par \par Patient name: KARINA GREWAL\par YOB: 1940   Age: 79 (F)   MR#: [id number]\par Study Date: 6/17/2019\par Location: [id number]: JUAN LUIS Zavala\par Study quality: Technically good\par Referring Physician: Viraj Plaza MD\par Blood Pressure: 130/60 mmHg\par Height: 152 cm\par Weight: 76 kg\par BSA: 1.7 m2\par ------------------------------------------------------------------------\par PROCEDURE: Transthoracic echocardiogram with 2-D, M-Mode\par and complete spectral and color flow Doppler.\par INDICATION: Abnormal electrocardiogram (ECG) (EKG) (R94.31)\par ------------------------------------------------------------------------\par DIMENSIONS:\par Dimensions:     Normal Values:\par LA:     3.0 cm    2.0 - 4.0 cm\par Ao:     3.0 cm    2.0 - 3.8 cm\par SEPTUM: 1.1 cm    0.6 - 1.2 cm\par PWT:    1.1 cm    0.6 - 1.1 cm\par LVIDd:  4.8 cm    3.0 - 5.6 cm\par LVIDs:  3.1 cm    1.8 - 4.0 cm\par Derived Variables:\par LVMI: 112 g/m2\par RWT: 0.45\par Fractional short: 35 %\par Ejection Fraction (Teicholtz): 65 %\par ------------------------------------------------------------------------\par OBSERVATIONS:\par Mitral Valve: Mitral annular calcification, otherwise\par normal mitral valve. Mild mitral regurgitation.\par Aortic Root: Normal aortic root.\par Aortic Valve: Calcified trileaflet aortic valve with normal\par opening.\par Left Atrium: Mildly dilated left atrium.  LA volume index =\par 38 cc/m2.\par Left Ventricle: Normal left ventricular systolic function.\par No segmental wall motion abnormalities. Increased relative\par wall thickness with normal left ventricular mass index,\par consistent with concentric left ventricular remodeling.\par (DT:208 ms).\par Right Heart: Normal right atrium. Normal right ventricular\par size and function. Normal tricuspid valve. Mild tricuspid\par regurgitation. Normal pulmonic valve.\par Pericardium/PleuraNormal pericardium with no pericardial\par effusion.\par Hemodynamic: Estimated right ventricular systolic pressure\par equals 42 mm Hg, assuming right atrial pressure equals 10\par mm Hg, consistent with mild pulmonary hypertension.\par ------------------------------------------------------------------------\par CONCLUSIONS:\par 1. Mitral annular calcification, otherwise normal mitral\par valve. Mild mitral regurgitation.\par 2. Calcified trileaflet aortic valve with normal opening.\par 3. Normal left ventricular systolic function. No segmental\par wall motion abnormalities.\par 4. Normal right ventricular size and function.\par 5. Normal tricuspid valve. Mild tricuspid regurgitation.\par ------------------------------------------------------------------------\par Confirmed on  6/17/2019 - 16:14:42 by kevin Lee M.D.\par \par \par \par Hospital Course	 \par 79F hx of HTN who presents to [facility] 6/15/19 with 2d RUQ abdominal pain associated \par with nausea/vomiting. She had fever and chills at home and is febrile in ED. \par She has not had pain like this before. She denies surgical history. She denies \par family hx of gallstones. She had been passing flatus and BMs normally. \par \par Admission ultrasound (6/15/19): Acute cholesystitis--Cholelithiasis in a \par nondistended gallbladder with mild wall thickening and trace pericholecystic \par fluid. Positive sonographic Lopez's sign. \par In the ED received Zosyn and 1L NS. She was admitted to the surgical service. \par \par 6/16: Pain improved overnight, tenderness decreased- possible outpatient \par cholecystectomy was discussed. Diet was advanced. \par - Patient had an episode of desaturation, requiring 4L of oxygen and \par tachycardia. EKG was sinus tachycardia. Troponin resulted was elevated to 80. \par -Cardiology was consulted but no symptoms were found concerning for acute \par myocardial injury, heart failure or acute lung pathology.  Her mildly positive \par troponin was deemed likely due to a small type II event in the setting of an \par acute illness. No signs of plaque rupture or acute ischemia; not consistent \par with ACS. She was cleared for operative intervention. \par \par Decision was made that given questions about cardiac status (unclear use of \par digoxin and bisoprolol) and mild improvement in pain, conservation management \par was ensued with plan for interval lap cholecystectomy in 6-8wks. \par \par Diet was advanced as tolerated. Oxygen was weaned. Pt currently ambulating, \par voiding, tolerating a regular diet, without significant pain. Patient is stable \par for discharge home to follow up as an outpatient, instructed to call to \par schedule appointment. \par

## 2019-08-19 ENCOUNTER — APPOINTMENT (OUTPATIENT)
Dept: TRAUMA SURGERY | Facility: CLINIC | Age: 79
End: 2019-08-19
Payer: MEDICARE

## 2019-08-19 VITALS
WEIGHT: 160 LBS | DIASTOLIC BLOOD PRESSURE: 85 MMHG | TEMPERATURE: 98.2 F | SYSTOLIC BLOOD PRESSURE: 135 MMHG | HEART RATE: 82 BPM | BODY MASS INDEX: 32.25 KG/M2 | HEIGHT: 59 IN

## 2019-08-19 PROCEDURE — 99024 POSTOP FOLLOW-UP VISIT: CPT

## 2019-08-24 NOTE — PHYSICAL EXAM
[Alert] : alert [Oriented to Person] : oriented to person [Oriented to Place] : oriented to place [Oriented to Time] : oriented to time [Calm] : calm [de-identified] : obese, incisions c/d/i without hernias [de-identified] : no calf tenderness, no ankle edema [de-identified] : no jaundice

## 2021-01-15 NOTE — PROGRESS NOTE ADULT - ASSESSMENT
79F acute cholecystitis    - OR tomorrow for lap camryn  - cardiac optimization  - IV zosyn  - NPO  - IVF resuscitation  - pain control  - VTE ppx    B Team [id number] Hide Biopsy Depth?: No Cryotherapy Text: The wound bed was treated with cryotherapy after the biopsy was performed. Biopsy Type: H and E Detail Level: Detailed Hemostasis: Beckie's Depth Of Biopsy: dermis Additional Anesthesia Volume In Cc (Will Not Render If 0): 0 Type Of Destruction Used: Curettage Silver Nitrate Text: The wound bed was treated with silver nitrate after the biopsy was performed. Render Post-Care Instructions In Note?: yes Anesthesia Type: 1% lidocaine without epinephrine Electrodesiccation And Curettage Text: The wound bed was treated with electrodesiccation and curettage after the biopsy was performed. Consent was obtained and risks were reviewed including but not limited to scarring, infection, bleeding, scabbing, incomplete removal, nerve damage and allergy to anesthesia. Wound Care: Vaseline Dressing: bandage Information: Selecting Yes will display possible errors in your note based on the variables you have selected. This validation is only offered as a suggestion for you. PLEASE NOTE THAT THE VALIDATION TEXT WILL BE REMOVED WHEN YOU FINALIZE YOUR NOTE. IF YOU WANT TO FAX A PRELIMINARY NOTE YOU WILL NEED TO TOGGLE THIS TO 'NO' IF YOU DO NOT WANT IT IN YOUR FAXED NOTE. Electrodesiccation Text: The wound bed was treated with electrodesiccation after the biopsy was performed. Post-Care Instructions: I reviewed with the patient in detail post-care instructions. Patient is to keep the biopsy site dry overnight, and then apply bacitracin twice daily until healed. Patient may apply hydrogen peroxide soaks to remove any crusting. Anesthesia Volume In Cc: 0.5 Billing Type: Third-Party Bill Notification Instructions: Patient will be notified of biopsy results. However, patient instructed to call the office if not contacted within 2 weeks. Biopsy Method: Dermablade Curettage Text: The wound bed was treated with curettage after the biopsy was performed.

## 2021-11-12 NOTE — PATIENT PROFILE ADULT - SURGICAL SITE INCISION
Received request via: Patient    Was the patient seen in the last year in this department? Yes    Does the patient have an active prescription (recently filled or refills available) for medication(s) requested? No   no

## 2022-09-21 NOTE — ASU PATIENT PROFILE, ADULT - PRO ARRIVE FROM
LM THAT PATIENT JUST NEEDS TO CALL BACK AND SCHEDULE A FOLLOW UP APPT BEFORE 10/02/22 OK FOR HUB TO SCHEDULE.    home

## 2023-03-01 NOTE — ASU PATIENT PROFILE, ADULT - NS PRO PT RIGHT SUPPORT PERSON
New PT order has been signed. Please let patient know that she can contact her PT office to schedule. Thanks AKASH    Yes

## 2024-09-27 NOTE — ASU PATIENT PROFILE, ADULT - ALCOHOL USE HISTORY SINGLE SELECT
Repeat STI testing in 4 weeks    NEXPLANON AFTERCARE INSTRUCTIONS   Keep dressing on and dry for 24 hours, then remove wrap.  Replace bandaid daily for 5 days. Keep your user card in a safe place where you'll remember it.    You may have some pain at the site of the Nexplanon insertion. You can help relieve the discomfort with Tylenol (acetaminophen), Aspirin or Advil (ibuprofen). If your discomfort worsens or you notice redness spreading on the skin around the insertion site, please call the clinic.     Irregular bleeding is common with Nexplanon, especially in the first 6-12 months of use. After one year, approximately 20% of women who use Nexplanon will stop having periods completely. Some women have longer, heavier periods. Some women will have increased spotting between periods. You may find that your periods may be hard to predict.     The Nexplanon does not protect against sexually transmitted infections including the AIDS virus (HIV), warts (HPV), gonorrhea, Chlamydia, and herpes. Condoms should be used to decrease the risk sexually transmitted infections. If you think that you have been exposed to a sexually transmitted infection, please call the clinic.     If you had Nexplanon placed for birth control, it is effective immediately if it was inserted within five days after the start of your period. If you have Nexplanon inserted at any other time during your menstrual cycle, use another method of birth control, like condoms for at least 7 days.     The Nexplanon should be removed and/or replaced by a health care provider after three years.   Warning Signs   Call the clinic if any of the following occurs:    You have bleeding, pus, or increasing redness, or pain at insertion site.    You have fever or chills    The implant comes out or you have concerns about its location.    You have a positive pregnancy test or suspect you might be pregnant.   
never
